# Patient Record
Sex: MALE | Race: WHITE | NOT HISPANIC OR LATINO | Employment: OTHER | URBAN - METROPOLITAN AREA
[De-identification: names, ages, dates, MRNs, and addresses within clinical notes are randomized per-mention and may not be internally consistent; named-entity substitution may affect disease eponyms.]

---

## 2017-10-17 ENCOUNTER — OFFICE VISIT (OUTPATIENT)
Dept: FAMILY MEDICINE | Facility: CLINIC | Age: 62
End: 2017-10-17

## 2017-10-17 VITALS
DIASTOLIC BLOOD PRESSURE: 108 MMHG | WEIGHT: 211.2 LBS | HEIGHT: 68 IN | SYSTOLIC BLOOD PRESSURE: 144 MMHG | OXYGEN SATURATION: 93 % | BODY MASS INDEX: 32.01 KG/M2 | HEART RATE: 56 BPM

## 2017-10-17 DIAGNOSIS — Z12.5 SCREENING FOR PROSTATE CANCER: ICD-10-CM

## 2017-10-17 DIAGNOSIS — I10 BENIGN ESSENTIAL HYPERTENSION: ICD-10-CM

## 2017-10-17 DIAGNOSIS — Z23 NEED FOR TD VACCINE: ICD-10-CM

## 2017-10-17 DIAGNOSIS — Z11.59 NEED FOR HEPATITIS C SCREENING TEST: Primary | ICD-10-CM

## 2017-10-17 LAB
% GRANULOCYTES: 71 % (ref 42.2–75.2)
HCT VFR BLD AUTO: 46.9 % (ref 39–51)
HEMOGLOBIN: 15.5 G/DL (ref 13.4–17.5)
LYMPHOCYTES NFR BLD AUTO: 21.7 % (ref 20.5–51.1)
MCH RBC QN AUTO: 29.9 PG (ref 27–31)
MCHC RBC AUTO-ENTMCNC: 33.1 G/DL (ref 33–37)
MCV RBC AUTO: 90.4 FL (ref 80–100)
MONOCYTES NFR BLD AUTO: 7.3 % (ref 1.7–9.3)
PLATELET # BLD AUTO: 283 K/UL (ref 140–450)
RBC # BLD AUTO: 5.19 X10/CMM (ref 4.2–5.9)
WBC # BLD AUTO: 7.2 X10/CMM (ref 3.8–11)

## 2017-10-17 PROCEDURE — 99214 OFFICE O/P EST MOD 30 MIN: CPT | Mod: 25 | Performed by: FAMILY MEDICINE

## 2017-10-17 PROCEDURE — 90714 TD VACC NO PRESV 7 YRS+ IM: CPT | Performed by: FAMILY MEDICINE

## 2017-10-17 PROCEDURE — 80050 GENERAL HEALTH PANEL: CPT | Mod: 90 | Performed by: FAMILY MEDICINE

## 2017-10-17 PROCEDURE — 90471 IMMUNIZATION ADMIN: CPT | Performed by: FAMILY MEDICINE

## 2017-10-17 PROCEDURE — 36415 COLL VENOUS BLD VENIPUNCTURE: CPT | Performed by: FAMILY MEDICINE

## 2017-10-17 PROCEDURE — 84153 ASSAY OF PSA TOTAL: CPT | Mod: 90 | Performed by: FAMILY MEDICINE

## 2017-10-17 RX ORDER — LISINOPRIL 10 MG/1
10 TABLET ORAL DAILY
Qty: 30 TABLET | Refills: 1 | Status: SHIPPED | OUTPATIENT
Start: 2017-10-17 | End: 2017-11-02

## 2017-10-17 NOTE — PATIENT INSTRUCTIONS
Hypertension   What is hypertension?   Hypertension is blood pressure that keeps being higher than normal. Blood pressure is the force of blood against artery walls as the heart pumps blood through the body. Blood pressure can be unhealthy if it is above 120/80. The higher your blood pressure, the greater the health risk.   High blood pressure can be controlled if you take these steps:   Maintain a healthy weight.   Are physically active.   Follow a healthy eating plan, which includes foods that do not have a lot of salt and sodium.   Do not drink a lot of alcohol.   Our goal is to keep the systolic (top) number 138 or lower and the diastolic (bottom) number 83 or lower                                   Dietary Approaches to Stop Hypertension (The DASH Diet)   What is hypertension?   Hypertension is blood pressure that keeps being higher than normal. Blood pressure is the force of blood against artery walls as the heart pumps blood through the body. Blood pressure can be unhealthy if it is above 120/80. The higher your blood pressure, the greater the health risk.   High blood pressure can be controlled if you take these steps:   Maintain a healthy weight.   Are physically active.   Follow a healthy eating plan, which includes foods that do not have a lot of salt and sodium.   Do not drink a lot of alcohol.   Diet affects high blood pressure. Following the DASH diet and reducing the amount of sodium in your diet will help lower your blood pressure. It will also help prevent high blood pressure.   What is the DASH diet?   Dietary Approaches to Stop Hypertension (DASH) is a diet that is low in saturated fat, cholesterol, and total fat. It emphasizes fruits, vegetables, and low-fat dairy foods. The DASH diet also includes whole-grain products, fish, poultry, and nuts. It encourages fewer servings of red meat, sweets, and sugar-containing beverages. It is rich in magnesium, potassium, and calcium, as well as protein  and fiber.   How do I get started on the DASH diet?   The DASH diet requires no special foods and has no hard-to-follow recipes. Start by seeing how DASH compares with your current eating habits.   The DASH eating plan illustrated below is based on a diet of 2,000 calories a day. Your healthcare provider or a dietitian can help you determine how many calories a day you need. Most adults need somewhere between 1600 and 2800 calories a day. Serving sizes for different foods vary from 1/2 cup to 1 and 1/4 cups. Check product nutrition labels for serving sizes and the number of calories per serving.                      Number of        Examples of  Food Group      servings         serving size  ----------------------------------------------------------------    Grains and      7 to 8 per day   1 slice of bread  grain products                   1 cup ready-to-eat cold cereal                                   1/2 cup cooked rice, pasta,                                   or cereal    Vegetables      4 to 5 per day   1 cup raw leafy vegetable                                   1/2 cup cooked vegetable                                   6 ounces (oz) vegetable juice      Fruits          4 to 5 per day   1 medium fruit                                   1/4 cup dried fruit                                   1/2 cup fresh, frozen, or                                   canned fruit                                   6 oz fruit juice      Low-fat or      2 to 3 per day   8 oz milk  fat-free                         1 cup yogurt  dairy foods                      1 and 1/2 oz cheese    Lean meats,  poultry,        2 or fewer per   3 oz cooked lean meat,  or fish         day              skinless poultry, or fish    Nuts, seeds,    4 to 5 per week  1/3 cup or 1 and 1/2 oz nuts  and dry beans                    1 tablespoon or 1/2 oz seeds                                   1/2 cup cooked dry beans    Fats and oils   2 to 3 per day   1  teaspoon soft margarine                                   1 tablespoon low-fat mayonnaise                                   2 tablespoons light salad                                   dressing                                   1 teaspoon vegetable oil    Sweets          5 per week       1 tablespoon sugar                                   1 tablespoon jelly or jam                                   1/2 oz jelly beans                                   8 oz lemonade  ----------------------------------------------------------------  Make changes gradually. Here are some suggestions that might help:   If you now eat 1 or 2 servings of vegetables a day, add a serving at lunch and another at dinner.   If you have not been eating fruit regularly, or have only juice at breakfast, add a serving to your meals or have it as a snack.   Drink milk or water with lunch or dinner instead of soda, sugar-sweetened tea, or alcohol. Choose low-fat (1%) or fat-free (nonfat) dairy products so that you are eating fewer calories and less saturated fat, total fat, and cholesterol.   Read food labels on margarines and salad dressings to choose products lowest in fat.   If you now eat large portions of meat, slowly cut back--by a half or a third at each meal. Limit meat to 6 ounces a day (two 3-ounce servings). Three to 4 ounces is about the size of a deck of cards.   Have 2 or more meatless meals each week. Increase servings of vegetables, rice, pasta, and beans in all meals. Try casseroles, pasta, and stir-pérez dishes, which have less meat and more vegetables, grains, and beans.   Use fruits canned in their own juice. Fresh fruits require little or no preparation. Dried fruits are a good choice to carry with you or to have ready in the car.   Try these snacks ideas: unsalted pretzels or nuts mixed with raisins, irina crackers, low-fat and fat-free yogurt or frozen yogurt, popcorn with no salt or butter added, and raw vegetables.   Choose  whole-grain foods to get more nutrients, including minerals and fiber. For example, choose whole-wheat bread, whole-grain cereals, or brown rice.   Use fresh, frozen, or no-salt-added canned vegetables.   Remember to also reduce the salt and sodium in your diet. Try to have no more than 2000 milligrams (mg) of sodium per day, with a goal of further reducing the sodium to 1500 mg per day. Three important ways to reduce sodium are:   Eat food products with reduced-sodium or no salt added.   Use less salt when you prepare foods and do not add salt to your food at the table.   Read food labels. Aim for foods that contain less than 5% of the daily value of sodium.   The DASH eating plan is not designed for weight loss. But it contains many lower-calorie foods, such as fruits and vegetables. You can make it lower in calories by replacing high-calorie foods with more fruits and vegetables. Some ideas to increase fruits and vegetables and decrease calories include:   Eat a medium apple instead of 4 shortbread cookies. You'll save 80 calories.   Eat 1/4 cup of dried apricots instead of a 2-ounce bag of pork rinds. You'll save 230 calories.   Have a hamburger that's 3 ounces instead of 6 ounces. Add a 1/2 cup serving of carrots and a 1/2 cup serving of spinach. You'll save more than 200 calories.   Instead of 5 ounces of chicken, have a stir pérez with 2 ounces of chicken and 1 and 1/2 cups of raw vegetables. Use just a small amount of vegetable oil. You'll save 50 calories.   Have a 1/2 cup serving of low-fat frozen yogurt instead of a 1-and-1/2-ounce chocolate bar. You'll save about 110 calories.   Use low-fat or fat-free condiments, such as fat-free salad dressings.   Eat smaller portions. Cut back gradually.   Use food labels to compare fat and calorie content in packaged foods. Items marked low fat or fat free may be lower in fat but not lower in calories than their regular versions.   Limit foods with lots of added sugar,  such as pies, flavored yogurts, candy bars, ice cream, sherbet, regular soft drinks, and fruit drinks.   Drink water or club soda instead of cola or other soda drinks.     For more information, see the National Heart, Lung, and Blood [place] Web site at: http://www.nhlbi.nih.gov/health/public/heart/hbp/dash/.

## 2017-10-17 NOTE — MR AVS SNAPSHOT
After Visit Summary   10/17/2017    Darrel Bernal    MRN: [id number]           Patient Information     Date Of Birth          1955        Visit Information        Provider Department      10/17/2017 4:30 PM David Padron MD [facility]        Today's Diagnoses     Need for hepatitis C screening test    -  1    Need for TD vaccine        Benign essential hypertension        Screening for prostate cancer          Care Instructions      Hypertension   What is hypertension?   Hypertension is blood pressure that keeps being higher than normal. Blood pressure is the force of blood against artery walls as the heart pumps blood through the body. Blood pressure can be unhealthy if it is above 120/80. The higher your blood pressure, the greater the health risk.   High blood pressure can be controlled if you take these steps:   Maintain a healthy weight.   Are physically active.   Follow a healthy eating plan, which includes foods that do not have a lot of salt and sodium.   Do not drink a lot of alcohol.   Our goal is to keep the systolic (top) number 138 or lower and the diastolic (bottom) number 83 or lower                                   Dietary Approaches to Stop Hypertension (The DASH Diet)   What is hypertension?   Hypertension is blood pressure that keeps being higher than normal. Blood pressure is the force of blood against artery walls as the heart pumps blood through the body. Blood pressure can be unhealthy if it is above 120/80. The higher your blood pressure, the greater the health risk.   High blood pressure can be controlled if you take these steps:   Maintain a healthy weight.   Are physically active.   Follow a healthy eating plan, which includes foods that do not have a lot of salt and sodium.   Do not drink a lot of alcohol.   Diet affects high blood pressure. Following the DASH diet and reducing the amount of sodium in your diet will help lower your blood pressure. It  will also help prevent high blood pressure.   What is the DASH diet?   Dietary Approaches to Stop Hypertension (DASH) is a diet that is low in saturated fat, cholesterol, and total fat. It emphasizes fruits, vegetables, and low-fat dairy foods. The DASH diet also includes whole-grain products, fish, poultry, and nuts. It encourages fewer servings of red meat, sweets, and sugar-containing beverages. It is rich in magnesium, potassium, and calcium, as well as protein and fiber.   How do I get started on the DASH diet?   The DASH diet requires no special foods and has no hard-to-follow recipes. Start by seeing how DASH compares with your current eating habits.   The DASH eating plan illustrated below is based on a diet of 2,000 calories a day. Your healthcare provider or a dietitian can help you determine how many calories a day you need. Most adults need somewhere between 1600 and 2800 calories a day. Serving sizes for different foods vary from 1/2 cup to 1 and 1/4 cups. Check product nutrition labels for serving sizes and the number of calories per serving.                      Number of        Examples of  Food Group      servings         serving size  ----------------------------------------------------------------    Grains and      7 to 8 per day   1 slice of bread  grain products                   1 cup ready-to-eat cold cereal                                   1/2 cup cooked rice, pasta,                                   or cereal    Vegetables      4 to 5 per day   1 cup raw leafy vegetable                                   1/2 cup cooked vegetable                                   6 ounces (oz) vegetable juice      Fruits          4 to 5 per day   1 medium fruit                                   1/4 cup dried fruit                                   1/2 cup fresh, frozen, or                                   canned fruit                                   6 oz fruit juice      Low-fat or      2 to 3 per day   8  oz milk  fat-free                         1 cup yogurt  dairy foods                      1 and 1/2 oz cheese    Lean meats,  poultry,        2 or fewer per   3 oz cooked lean meat,  or fish         day              skinless poultry, or fish    Nuts, seeds,    4 to 5 per week  1/3 cup or 1 and 1/2 oz nuts  and dry beans                    1 tablespoon or 1/2 oz seeds                                   1/2 cup cooked dry beans    Fats and oils   2 to 3 per day   1 teaspoon soft margarine                                   1 tablespoon low-fat mayonnaise                                   2 tablespoons light salad                                   dressing                                   1 teaspoon vegetable oil    Sweets          5 per week       1 tablespoon sugar                                   1 tablespoon jelly or jam                                   1/2 oz jelly beans                                   8 oz lemonade  ----------------------------------------------------------------  Make changes gradually. Here are some suggestions that might help:   If you now eat 1 or 2 servings of vegetables a day, add a serving at lunch and another at dinner.   If you have not been eating fruit regularly, or have only juice at breakfast, add a serving to your meals or have it as a snack.   Drink milk or water with lunch or dinner instead of soda, sugar-sweetened tea, or alcohol. Choose low-fat (1%) or fat-free (nonfat) dairy products so that you are eating fewer calories and less saturated fat, total fat, and cholesterol.   Read food labels on margarines and salad dressings to choose products lowest in fat.   If you now eat large portions of meat, slowly cut back--by a half or a third at each meal. Limit meat to 6 ounces a day (two 3-ounce servings). Three to 4 ounces is about the size of a deck of cards.   Have 2 or more meatless meals each week. Increase servings of vegetables, rice, pasta, and beans in all meals. Try  casseroles, pasta, and stir-pérez dishes, which have less meat and more vegetables, grains, and beans.   Use fruits canned in their own juice. Fresh fruits require little or no preparation. Dried fruits are a good choice to carry with you or to have ready in the car.   Try these snacks ideas: unsalted pretzels or nuts mixed with raisins, irina crackers, low-fat and fat-free yogurt or frozen yogurt, popcorn with no salt or butter added, and raw vegetables.   Choose whole-grain foods to get more nutrients, including minerals and fiber. For example, choose whole-wheat bread, whole-grain cereals, or brown rice.   Use fresh, frozen, or no-salt-added canned vegetables.   Remember to also reduce the salt and sodium in your diet. Try to have no more than 2000 milligrams (mg) of sodium per day, with a goal of further reducing the sodium to 1500 mg per day. Three important ways to reduce sodium are:   Eat food products with reduced-sodium or no salt added.   Use less salt when you prepare foods and do not add salt to your food at the table.   Read food labels. Aim for foods that contain less than 5% of the daily value of sodium.   The DASH eating plan is not designed for weight loss. But it contains many lower-calorie foods, such as fruits and vegetables. You can make it lower in calories by replacing high-calorie foods with more fruits and vegetables. Some ideas to increase fruits and vegetables and decrease calories include:   Eat a medium apple instead of 4 shortbread cookies. You'll save 80 calories.   Eat 1/4 cup of dried apricots instead of a 2-ounce bag of pork rinds. You'll save 230 calories.   Have a hamburger that's 3 ounces instead of 6 ounces. Add a 1/2 cup serving of carrots and a 1/2 cup serving of spinach. You'll save more than 200 calories.   Instead of 5 ounces of chicken, have a stir pérez with 2 ounces of chicken and 1 and 1/2 cups of raw vegetables. Use just a small amount of vegetable oil. You'll save 50  "calories.   Have a 1/2 cup serving of low-fat frozen yogurt instead of a 1-and-1/2-ounce chocolate bar. You'll save about 110 calories.   Use low-fat or fat-free condiments, such as fat-free salad dressings.   Eat smaller portions. Cut back gradually.   Use food labels to compare fat and calorie content in packaged foods. Items marked low fat or fat free may be lower in fat but not lower in calories than their regular versions.   Limit foods with lots of added sugar, such as pies, flavored yogurts, candy bars, ice cream, sherbet, regular soft drinks, and fruit drinks.   Drink water or club soda instead of cola or other soda drinks.     For more information, see the National Heart, Lung, and Blood [place] Web site at: http://www.nhlbi.nih.gov/health/public/heart/hbp/dash/.             Follow-ups after your visit        Who to contact     If you have questions or need follow up information about today's clinic visit or your schedule please contact [facility] directly at [phone].  Normal or non-critical lab and imaging results will be communicated to you by Berylliumhart, letter or phone within 4 business days after the clinic has received the results. If you do not hear from us within 7 days, please contact the clinic through Kidamomt or phone. If you have a critical or abnormal lab result, we will notify you by phone as soon as possible.  Submit refill requests through FOBO or call your pharmacy and they will forward the refill request to us. Please allow 3 business days for your refill to be completed.          Additional Information About Your Visit        [email or web address] Information     FOBO lets you send messages to your doctor, view your test results, renew your prescriptions, schedule appointments and more. To sign up, go to [email or web address] . Click on \"Log in\" on the left side of the screen, which will take you to the Welcome page. Then click on \"Sign up Now\" on the right side of the page. " "    You will be asked to enter the access code listed below, as well as some personal information. Please follow the directions to create your username and password.     Your access code is: [email or web address]  Expires: 1/15/2018  5:25 PM     Your access code will  in 90 days. If you need help or a new code, please call your [place] clinic or [phone].        Care EveryWhere ID     This is your Care EveryWhere ID. This could be used by other organizations to access your [place] medical records  [id number]        Your Vitals Were     Pulse Height Pulse Oximetry BMI (Body Mass Index)          56 1.715 m (5' 7.5\") 93% 32.59 kg/m2         Blood Pressure from Last 3 Encounters:   10/17/17 (!) 144/108   10/15/15 120/83   09/11/15 (!) 160/100    Weight from Last 3 Encounters:   10/17/17 95.8 kg (211 lb 3.2 oz)   10/15/15 92.1 kg (203 lb)   09/11/15 96.2 kg (212 lb)              We Performed the Following     CBC with Diff/Plt ([facility])     Comp. Metabolic Panel (14) ([facility])     PSA Serum ([facility])     TD PRESERV FREE >=7 YRS ADS IM     TSH ([facility])          Today's Medication Changes          These changes are accurate as of: 10/17/17  5:25 PM.  If you have any questions, ask your nurse or doctor.               Start taking these medicines.        Dose/Directions    lisinopril 10 MG tablet   Commonly known as:  PRINIVIL/ZESTRIL   Used for:  Benign essential hypertension   Started by:  David Padron MD        Dose:  10 mg   Take 1 tablet (10 mg) by mouth daily   Quantity:  30 tablet   Refills:  1            Where to get your medicines      These medications were sent to [facility] [address] [facility] [place] [address]  [address] [facility] [address]     Phone:  [phone]     lisinopril 10 MG tablet                Primary Care Provider Office Phone # Fax #    David Padron [phone] [address] [place] [address]        Equal Access to " Services     [facility]: Hadii aad ku hadyoletteju Sameeratereza, waaxda luqadaha, qaybta kaalmada isabel, yana julio . So [facility] [phone].    ATENCIÓN: Si habla español, tiene a jerez disposición servicios gratuitos de asistencia lingüística. Llame al [phone].    We comply with applicable federal civil rights laws and [place] laws. We do not discriminate on the basis of race, color, national origin, age, disability, sex, sexual orientation, or gender identity.            Thank you!     Thank you for choosing [facility]  for your care. Our goal is always to provide you with excellent care. Hearing back from our patients is one way we can continue to improve our services. Please take a few minutes to complete the written survey that you may receive in the mail after your visit with us. Thank you!             Your Updated Medication List - Protect others around you: Learn how to safely use, store and throw away your medicines at www.disposemymeds.org.          This list is accurate as of: 10/17/17  5:25 PM.  Always use your most recent med list.                   Brand Name Dispense Instructions for use Diagnosis    AMBIEN 10 MG tablet   Generic drug:  zolpidem     10 tablet    Take 1 tablet (10 mg) by mouth nightly as needed for sleep    Insomnia       aspirin 81 MG chewable tablet      Take 81 mg by mouth daily        lisinopril 10 MG tablet    PRINIVIL/ZESTRIL    30 tablet    Take 1 tablet (10 mg) by mouth daily    Benign essential hypertension

## 2017-10-17 NOTE — LETTER
[facility] [address] [place]  [address]  Phone: [phone]    October 20, 2017      Darrel Bernal  [address] [facility] [address]              Dear Darrel,    I am writing to report that your included test results are within expected ranges. I do not suggest that we make any changes at this time.         Sincerely,     David Padron M.D.    Results for orders placed or performed in visit on 10/17/17   Comp. Metabolic Panel (14) ([facility])   Result Value Ref Range    Glucose 83 65 - 99 mg/dL    Urea Nitrogen 22 8 - 27 mg/dL    Creatinine 0.97 0.76 - 1.27 mg/dL    eGFR If NonAfricn Am 83 >59 mL/min/1.73    eGFR If Africn Am 96 >59 mL/min/1.73    BUN/Creatinine Ratio 23 10 - 24    Sodium 142 134 - 144 mmol/L    Potassium 4.6 3.5 - 5.2 mmol/L    Chloride 102 96 - 106 mmol/L    Total CO2 24 18 - 28 mmol/L    Calcium 9.5 8.6 - 10.2 mg/dL    Protein Total 7.5 6.0 - 8.5 g/dL    Albumin 4.6 3.6 - 4.8 g/dL    Globulin, Total 2.9 1.5 - 4.5 g/dL    A/G Ratio 1.6 1.2 - 2.2    Bilirubin Total 0.4 0.0 - 1.2 mg/dL    Alkaline Phosphatase 89 39 - 117 IU/L    AST 36 0 - 40 IU/L    ALT 46 (H) 0 - 44 IU/L    Narrative    Performed at:  01 - [facility] [address], [place], [place]  [id number]  : El Arthur MD, Phone:  [phone]   CBC with Diff/Plt ([facility])   Result Value Ref Range    WBC x10/cmm 7.2 3.8 - 11.0 x10/cmm    % Lymphocytes 21.7 20.5 - 51.1 %    % Monocytes 7.3 1.7 - 9.3 %    % Granulocytes 71.0 42.2 - 75.2 %    RBC x10/cmm 5.19 4.2 - 5.9 x10/cmm    Hemoglobin 15.5 13.4 - 17.5 g/dl    Hematocrit 46.9 39 - 51 %    MCV 90.4 80 - 100 fL    MCH 29.9 27.0 - 31.0 pg    MCHC 33.1 33.0 - 37.0 g/dL    Platelet Count 283 140 - 450 K/uL   PSA Serum ([facility])   Result Value Ref Range    PSA NG/ML 1.5 0.0 - 4.0 ng/mL    Narrative    Performed at:  01 - [facility] [address], [place], [place]  [id number]  : El Arthur MD, Phone:  [phone]   TSH  ([facility])   Result Value Ref Range    TSH 2.670 0.450 - 4.500 uIU/mL    Narrative    Performed at:  01 - [facility] [address], [place], [place]  [id number]  : El Arthur MD, Phone:  [phone]

## 2017-10-17 NOTE — PROGRESS NOTES
Problem(s) Oriented visit        SUBJECTIVE:                                                    Darrel Bernal is a 62 year old male who presents to clinic today for the following health issues :    1. Need for hepatitis C screening test  due    2. Need for TD vaccine  due  - TD PRESERV FREE >=7 YRS ADS IM    3. Benign essential hypertension  he has Hypertension which is discovered today to not currently well controlled. he has not been on any medications and is interested to see if we can obtain better control of the blood pressure.    Reviewed last 6 BP readings in chart:  BP Readings from Last 6 Encounters:   10/17/17 (!) 144/108   10/15/15 120/83   09/11/15 (!) 160/100   12/03/13 120/70   11/18/13 140/80     NO active cardiac complaints or symptoms with exercise.       - Comp. Metabolic Panel (14) ([facility])  - CBC with Diff/Plt ([facility])  - TSH ([facility])  - lisinopril (PRINIVIL/ZESTRIL) 10 MG tablet; Take 1 tablet (10 mg) by mouth daily  Dispense: 30 tablet; Refill: 1    4. Screening for prostate cancer    - PSA Serum ([facility])         Problem list, Medication list, Allergies, and Medical/Social/Surgical histories reviewed in [facility] and updated as appropriate.   Additional history: as documented    ROS:  General and Resp. 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        OBJECTIVE:                                                    BP (!) 144/108  Pulse 56  Ht 1.715 m (5' 7.5\")  Wt 95.8 kg (211 lb 3.2 oz)  SpO2 93%  BMI 32.59 kg/m2  Body mass index is 32.59 kg/(m^2).   APPEARANCE: = Relaxed and in no distress  Conj/Eyelids = noninjected and lids and lashes are without inflammation  PERRLA/Irises = Pupils Round Reactive to Light and Irisis without inflammation  Neck = No anterior or posterior adenopathy appreciated.  Thyroid = Not enlarged and no masses felt  Resp effort = Calm regular breathing  Breath Sounds = Good air movement with no rales or rhonchi in any lung fields  Heart Rate, Rythym, & sounds (no Murm)  = Regular rate and rythym with no S3, S4, or murmer appreciated.  Carotid Art's = Pulses full and equal and no bruits appreciated  Abdomen = Soft, nontender, no masses, & bowel sounds in all quadrants  Liver/Spleen = Normal span and no splenomegaly noted  Digits and Nails = FROM in all finger joints, no nail dystrophy  Ext (edema) = No pretibial edema noted or elsewhere  Musculsktl =  Strength and ROM of major joints are within normal limits  SKIN = absent significant rashes or lesions   Recent/Remote Memory = Alert and Oriented x 3  Mood/Affect = Cooperative and interested     ASSESSMENT/PLAN:                                                        Darrel was seen today for hypertension and recheck medication.    Diagnoses and all orders for this visit:    Need for hepatitis C screening test    Need for TD vaccine  -     TD PRESERV FREE >=7 YRS ADS IM    Benign essential hypertension  -     Comp. Metabolic Panel (14) ([facility])  -     CBC with Diff/Plt ([facility])  -     TSH ([facility])  -     lisinopril (PRINIVIL/ZESTRIL) 10 MG tablet; Take 1 tablet (10 mg) by mouth daily    Screening for prostate cancer  -     PSA Serum ([facility])    Other orders  -     Cancel: HCV Antibody " ([facility])      Work on weight loss  Regular exercise    The following health maintenance items are reviewed in Epic and correct as of today:  Health Maintenance   Topic Date Due     TETANUS IMMUNIZATION (SYSTEM ASSIGNED)  03/16/1972     HEPATITIS C SCREENING  03/16/1972     ADVANCE DIRECTIVE PLANNING Q5 YRS  12/03/2018     LIPID SCREEN Q5 YR MALE (SYSTEM ASSIGNED)  09/11/2020     COLONOSCOPY Q10 YR  10/19/2025     INFLUENZA VACCINE (SYSTEM ASSIGNED)  Completed       David Padron MD  [facility]  Family Practice  [facility]  [phone]    For any issues my office # is [phone]

## 2017-10-19 LAB
ALBUMIN SERPL-MCNC: 4.6 G/DL (ref 3.6–4.8)
ALBUMIN/GLOB SERPL: 1.6 {RATIO} (ref 1.2–2.2)
ALP SERPL-CCNC: 89 IU/L (ref 39–117)
ALT SERPL-CCNC: 46 IU/L (ref 0–44)
AST SERPL-CCNC: 36 IU/L (ref 0–40)
BILIRUB SERPL-MCNC: 0.4 MG/DL (ref 0–1.2)
BUN SERPL-MCNC: 22 MG/DL (ref 8–27)
BUN/CREATININE RATIO: 23 (ref 10–24)
CALCIUM SERPL-MCNC: 9.5 MG/DL (ref 8.6–10.2)
CHLORIDE SERPLBLD-SCNC: 102 MMOL/L (ref 96–106)
CREAT SERPL-MCNC: 0.97 MG/DL (ref 0.76–1.27)
EGFR IF AFRICN AM: 96 ML/MIN/1.73
EGFR IF NONAFRICN AM: 83 ML/MIN/1.73
GLOBULIN, TOTAL: 2.9 G/DL (ref 1.5–4.5)
GLUCOSE SERPL-MCNC: 83 MG/DL (ref 65–99)
POTASSIUM SERPL-SCNC: 4.6 MMOL/L (ref 3.5–5.2)
PROT SERPL-MCNC: 7.5 G/DL (ref 6–8.5)
PSA NG/ML: 1.5 NG/ML (ref 0–4)
SODIUM SERPL-SCNC: 142 MMOL/L (ref 134–144)
TOTAL CO2: 24 MMOL/L (ref 18–28)
TSH BLD-ACNC: 2.67 UIU/ML (ref 0.45–4.5)

## 2017-10-19 NOTE — PROGRESS NOTES
Dear Darrel,   I am writing to report that your included test results are within expected ranges. I do not suggest that we make any changes at this time.    David Padron M.D.

## 2017-11-02 ENCOUNTER — OFFICE VISIT (OUTPATIENT)
Dept: FAMILY MEDICINE | Facility: CLINIC | Age: 62
End: 2017-11-02

## 2017-11-02 VITALS
HEART RATE: 66 BPM | SYSTOLIC BLOOD PRESSURE: 124 MMHG | DIASTOLIC BLOOD PRESSURE: 88 MMHG | OXYGEN SATURATION: 97 % | RESPIRATION RATE: 20 BRPM | BODY MASS INDEX: 32.16 KG/M2 | WEIGHT: 208.4 LBS

## 2017-11-02 DIAGNOSIS — I10 BENIGN ESSENTIAL HYPERTENSION: Primary | ICD-10-CM

## 2017-11-02 DIAGNOSIS — Z13.220 LIPID SCREENING: ICD-10-CM

## 2017-11-02 PROCEDURE — 99213 OFFICE O/P EST LOW 20 MIN: CPT | Performed by: FAMILY MEDICINE

## 2017-11-02 PROCEDURE — 36415 COLL VENOUS BLD VENIPUNCTURE: CPT | Performed by: FAMILY MEDICINE

## 2017-11-02 PROCEDURE — 80061 LIPID PANEL: CPT | Mod: 90 | Performed by: FAMILY MEDICINE

## 2017-11-02 PROCEDURE — 80048 BASIC METABOLIC PNL TOTAL CA: CPT | Mod: 90 | Performed by: FAMILY MEDICINE

## 2017-11-02 RX ORDER — LISINOPRIL 10 MG/1
10 TABLET ORAL DAILY
Qty: 30 TABLET | Refills: 11 | Status: SHIPPED | OUTPATIENT
Start: 2017-11-02 | End: 2019-01-16

## 2017-11-02 NOTE — LETTER
[facility] [address] [place]  [address]  Phone: [phone]    November 8, 2017      Darrel Bernal  [address] [facility] [facility] [facility] [address]              Dear Darrel,    I am writing to report that your included test results are within expected ranges. I do not suggest that we make any changes at this time.       Sincerely,     David Padron M.D.    Results for orders placed or performed in visit on 11/02/17   Lipid Panel ([facility])   Result Value Ref Range    Cholesterol 192 100 - 199 mg/dL    Triglycerides 61 0 - 149 mg/dL    HDL Cholesterol 52 >39 mg/dL    VLDL Cholesterol Andrew 12 5 - 40 mg/dL    LDL Cholesterol Calculated 128 (H) 0 - 99 mg/dL    LDL/HDL Ratio 2.5 0.0 - 3.6 ratio units    Narrative    Performed at:  01 - [facility] [address], [place], [place]  [id number]  : El Arthur MD, Phone:  [phone]   Basic Metabolic Panel (8) ([facility])   Result Value Ref Range    Glucose 93 65 - 99 mg/dL    Urea Nitrogen 21 8 - 27 mg/dL    Creatinine 0.87 0.76 - 1.27 mg/dL    eGFR If NonAfricn Am 92 >59 mL/min/1.73    eGFR If Africn Am 106 >59 mL/min/1.73    BUN/Creatinine Ratio 24 10 - 24    Sodium 141 134 - 144 mmol/L    Potassium 4.8 3.5 - 5.2 mmol/L    Chloride 102 96 - 106 mmol/L    Total CO2 25 18 - 28 mmol/L    Calcium 9.2 8.6 - 10.2 mg/dL    Narrative    Performed at:  01 - [facility] [address], [place], [place]  [id number]  : El Arthur MD, Phone:  [phone]

## 2017-11-02 NOTE — PROGRESS NOTES
Problem(s) Oriented visit        SUBJECTIVE:                                                    Darrel Bernal is a 62 year old male who presents to clinic today for the following health issues :        1. Benign essential hypertension  Blood presure remains well controlled when checked out of clinic.    Reviewed last 6 BP readings in chart:  BP Readings from Last 6 Encounters:   11/02/17 124/88   10/17/17 (!) 144/108   10/15/15 120/83   09/11/15 (!) 160/100   12/03/13 120/70   11/18/13 140/80       he has not experienced any significant side effects from medications for hypertension.    NO active cardiac complaints or symptoms with exercise.    - Basic Metabolic Panel (8) ([facility])    2. Lipid screening  Due   - Lipid Panel ([facility])         Problem list, Medication list, Allergies, and Medical/Social/Surgical histories reviewed in EPIC and updated as appropriate.   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        OBJECTIVE:                                                    /88  Pulse 66  Resp 20  Wt 94.5 kg (208 lb 6.4 oz)  SpO2 97%  BMI 32.16 kg/m2  Body mass index is 32.16 kg/(m^2).   APPEARANCE:  = Relaxed and in no distress     ASSESSMENT/PLAN:                                                        Darrel was seen today for hypertension and recheck medication.    Diagnoses and all orders for this visit:    Benign essential hypertension  -     Basic Metabolic Panel (8) ([facility])    Lipid screening  -     Lipid Panel ([facility])        Work on weight loss  Regular exercise    The following health maintenance items are reviewed in Epic and correct as of today:  Health Maintenance   Topic Date Due     HEPATITIS C SCREENING  03/16/1972     ADVANCE DIRECTIVE PLANNING Q5 YRS  12/03/2018     LIPID SCREEN Q5 YR MALE (SYSTEM ASSIGNED)  09/11/2020     COLONOSCOPY Q10 YR  10/19/2025     TETANUS IMMUNIZATION (SYSTEM ASSIGNED)  10/17/2027     INFLUENZA VACCINE (SYSTEM ASSIGNED)  Completed       David Padron MD  [facility]  Family Practice  [facility]  [phone]    For any issues my office # is [phone]

## 2017-11-02 NOTE — MR AVS SNAPSHOT
"              After Visit Summary   2017    Darrel Bernal    MRN: [id number]           Patient Information     Date Of Birth          1955        Visit Information        Provider Department      2017 12:45 PM David Padron MD [facility]        Today's Diagnoses     Benign essential hypertension    -  1    Lipid screening           Follow-ups after your visit        Who to contact     If you have questions or need follow up information about today's clinic visit or your schedule please contact [facility] directly at [phone].  Normal or non-critical lab and imaging results will be communicated to you by MyChart, letter or phone within 4 business days after the clinic has received the results. If you do not hear from us within 7 days, please contact the clinic through [employer] or phone. If you have a critical or abnormal lab result, we will notify you by phone as soon as possible.  Submit refill requests through [employer] or call your pharmacy and they will forward the refill request to us. Please allow 3 business days for your refill to be completed.          Additional Information About Your Visit        MyChart Information     [employer] lets you send messages to your doctor, view your test results, renew your prescriptions, schedule appointments and more. To sign up, go to [email or web address] . Click on \"Log in\" on the left side of the screen, which will take you to the Welcome page. Then click on \"Sign up Now\" on the right side of the page.     You will be asked to enter the access code listed below, as well as some personal information. Please follow the directions to create your username and password.     Your access code is: [email or web address]  Expires: 1/15/2018  5:25 PM     Your access code will  in 90 days. If you need help or a new code, please call your [place] clinic or [phone].        Care EveryWhere ID     This is your Care EveryWhere ID. This " could be used by other organizations to access your [facility] medical records  [id number]        Your Vitals Were     Pulse Respirations Pulse Oximetry BMI (Body Mass Index)          66 20 97% 32.16 kg/m2         Blood Pressure from Last 3 Encounters:   11/02/17 124/88   10/17/17 (!) 144/108   10/15/15 120/83    Weight from Last 3 Encounters:   11/02/17 94.5 kg (208 lb 6.4 oz)   10/17/17 95.8 kg (211 lb 3.2 oz)   10/15/15 92.1 kg (203 lb)              We Performed the Following     Basic Metabolic Panel (8) ([facility])     Lipid Panel ([facility])          Where to get your medicines      These medications were sent to [facility] [address] [facility] - [place], [place] - [address]  [address] [address]     Phone:  [phone]     lisinopril 10 MG tablet          Primary Care Provider Office Phone # Fax #    David Padron [phone] [address]  [facility] [address]        Equal Access to Services     [facility]: Hadii aad ku hadasho Soomaali, waaxda luqadaha, qaybta kaalmada adeegyada, waxay idiin hayaan sherif lorenzoaraisa julio ah. So [facility] [phone].    ATENCIÓN: Si habla español, tiene a jerez disposición servicios gratuitos de asistencia lingüística. Llame al [phone].    We comply with applicable federal civil rights laws and [place] laws. We do not discriminate on the basis of race, color, national origin, age, disability, sex, sexual orientation, or gender identity.            Thank you!     Thank you for choosing [facility]  for your care. Our goal is always to provide you with excellent care. Hearing back from our patients is one way we can continue to improve our services. Please take a few minutes to complete the written survey that you may receive in the mail after your visit with us. Thank you!             Your Updated Medication List - Protect others around you: Learn how to safely use, store and throw away your medicines  at www.disposemymeds.org.          This list is accurate as of: 11/2/17  1:28 PM.  Always use your most recent med list.                   Brand Name Dispense Instructions for use Diagnosis    AMBIEN 10 MG tablet   Generic drug:  zolpidem     10 tablet    Take 10 mg by mouth nightly as needed for sleep Just for traveling    Insomnia       aspirin 81 MG chewable tablet      Take 81 mg by mouth daily        lisinopril 10 MG tablet    PRINIVIL/ZESTRIL    30 tablet    Take 1 tablet (10 mg) by mouth daily    Benign essential hypertension

## 2017-11-03 LAB
BUN SERPL-MCNC: 21 MG/DL (ref 8–27)
BUN/CREATININE RATIO: 24 (ref 10–24)
CALCIUM SERPL-MCNC: 9.2 MG/DL (ref 8.6–10.2)
CHLORIDE SERPLBLD-SCNC: 102 MMOL/L (ref 96–106)
CHOLEST SERPL-MCNC: 192 MG/DL (ref 100–199)
CREAT SERPL-MCNC: 0.87 MG/DL (ref 0.76–1.27)
EGFR IF AFRICN AM: 106 ML/MIN/1.73
EGFR IF NONAFRICN AM: 92 ML/MIN/1.73
GLUCOSE SERPL-MCNC: 93 MG/DL (ref 65–99)
HDLC SERPL-MCNC: 52 MG/DL
LDL/HDL RATIO: 2.5 RATIO UNITS (ref 0–3.6)
LDLC SERPL CALC-MCNC: 128 MG/DL (ref 0–99)
POTASSIUM SERPL-SCNC: 4.8 MMOL/L (ref 3.5–5.2)
SODIUM SERPL-SCNC: 141 MMOL/L (ref 134–144)
TOTAL CO2: 25 MMOL/L (ref 18–28)
TRIGL SERPL-MCNC: 61 MG/DL (ref 0–149)
VLDLC SERPL CALC-MCNC: 12 MG/DL (ref 5–40)

## 2017-12-04 ENCOUNTER — OFFICE VISIT (OUTPATIENT)
Dept: FAMILY MEDICINE | Facility: CLINIC | Age: 62
End: 2017-12-04

## 2017-12-04 VITALS
HEIGHT: 68 IN | HEART RATE: 57 BPM | BODY MASS INDEX: 31.37 KG/M2 | WEIGHT: 207 LBS | SYSTOLIC BLOOD PRESSURE: 110 MMHG | DIASTOLIC BLOOD PRESSURE: 80 MMHG | OXYGEN SATURATION: 98 %

## 2017-12-04 DIAGNOSIS — Z00.00 ROUTINE GENERAL MEDICAL EXAMINATION AT A HEALTH CARE FACILITY: Primary | ICD-10-CM

## 2017-12-04 DIAGNOSIS — G40.109 PARTIAL EPILEPSY (H): ICD-10-CM

## 2017-12-04 PROCEDURE — 99396 PREV VISIT EST AGE 40-64: CPT | Performed by: FAMILY MEDICINE

## 2017-12-04 ASSESSMENT — PATIENT HEALTH QUESTIONNAIRE - PHQ9: SUM OF ALL RESPONSES TO PHQ QUESTIONS 1-9: 5

## 2017-12-04 NOTE — MR AVS SNAPSHOT
"              After Visit Summary   12/4/2017    Darrel Bernal    MRN: [id number]           Patient Information     Date Of Birth          1955        Visit Information        Provider Department      12/4/2017 8:45 AM David Padron MD [facility]        Today's Diagnoses     Routine general medical examination at a health care facility    -  1    Partial epilepsy (H)          Care Instructions    Thanks for coming in to see me today!    Your next visit with us should be scheduled for Follow up in 6 months.    Listed next are the medical health Maintenance items we are tracking for your care and when they are next due (or overdue!)  Our goal is 100% \"up to date.\" Keep this list handy to call and schedule what you are due for in the future!    Health Maintenance Due   Topic Date Due     HEPATITIS C SCREENING  03/16/1972         Preventive Health Recommendations  Male Ages 50 - 64    Yearly exam:             See your health care provider every year in order to  o   Review health changes.   o   Discuss preventive care.    o   Review your medicines if your doctor has prescribed any.     Have a cholesterol test every 5 years, or more frequently if you are at risk for high cholesterol/heart disease.     Have a diabetes test (fasting glucose) every three years. If you are at risk for diabetes, you should have this test more often.     Have a colonoscopy at age 50, or have a yearly FIT test (stool test). These exams will check for colon cancer.      Talk with your health care provider about whether or not a prostate cancer screening test (PSA) is right for you.    You should be tested each year for STDs (sexually transmitted diseases), if you re at risk.     Shots: Get a flu shot each year. Get a tetanus shot every 10 years.     Nutrition:    Eat at least 5 servings of fruits and vegetables daily.     Eat whole-grain bread, whole-wheat pasta and brown rice instead of white grains and rice.     Talk to " "your provider about Calcium and Vitamin D.     Lifestyle    Exercise for at least 150 minutes a week (30 minutes a day, 5 days a week). This will help you control your weight and prevent disease.     Limit alcohol to one drink per day.     No smoking.     Wear sunscreen to prevent skin cancer.     See your dentist every six months for an exam and cleaning.     See your eye doctor every 1 to 2 years.            Follow-ups after your visit        Who to contact     If you have questions or need follow up information about today's clinic visit or your schedule please contact [facility] directly at [phone].  Normal or non-critical lab and imaging results will be communicated to you by [employer] I[employer], letter or phone within 4 business days after the clinic has received the results. If you do not hear from us within 7 days, please contact the clinic through [employer] or phone. If you have a critical or abnormal lab result, we will notify you by phone as soon as possible.  Submit refill requests through [employer] or call your pharmacy and they will forward the refill request to us. Please allow 3 business days for your refill to be completed.          Additional Information About Your Visit        MyChart Information     [employer]ility lets you send messages to your doctor, view your test results, renew your prescriptions, schedule appointments and more. To sign up, go to [email or web address] . Click on \"Log in\" on the left side of the screen, which will take you to the Welcome page. Then click on \"Sign up Now\" on the right side of the page.     You will be asked to enter the access code listed below, as well as some personal information. Please follow the directions to create your username and password.     Your access code is: [email or web address]  Expires: 1/15/2018  4:25 PM     Your access code will  in 90 days. If you need help or a new code, please call your [place] clinic or [phone].        Care EveryWhere ID " "    This is your Care EveryWhere ID. This could be used by other organizations to access your [facility] medical records  [id number]        Your Vitals Were     Pulse Height Pulse Oximetry BMI (Body Mass Index)          57 1.715 m (5' 7.5\") 98% 31.94 kg/m2         Blood Pressure from Last 3 Encounters:   12/04/17 110/80   11/02/17 124/88   10/17/17 (!) 144/108    Weight from Last 3 Encounters:   12/04/17 93.9 kg (207 lb)   11/02/17 94.5 kg (208 lb 6.4 oz)   10/17/17 95.8 kg (211 lb 3.2 oz)              Today, you had the following     No orders found for display       Primary Care Provider Office Phone # Fax #    David Padron [phone] [address]  [facility] [address]        Equal Access to Services     [facility]: Hadii aad ku hadasho Soomaali, waaxda luqadaha, qaybta kaalmada adeegyada, waxay idiin haymiriamn sherif kharaisa julio . So [facility] [phone].    ATENCIÓN: Si habla español, tiene a jerez disposición servicios gratuitos de asistencia lingüística. Llame al [phone].    We comply with applicable federal civil rights laws and [place] laws. We do not discriminate on the basis of race, color, national origin, age, disability, sex, sexual orientation, or gender identity.            Thank you!     Thank you for choosing [facility]  for your care. Our goal is always to provide you with excellent care. Hearing back from our patients is one way we can continue to improve our services. Please take a few minutes to complete the written survey that you may receive in the mail after your visit with us. Thank you!             Your Updated Medication List - Protect others around you: Learn how to safely use, store and throw away your medicines at www.disposemymeds.org.          This list is accurate as of: 12/4/17  9:48 AM.  Always use your most recent med list.                   Brand Name Dispense Instructions for use Diagnosis    AMBIEN 10 MG tablet   Generic " drug:  zolpidem     10 tablet    Take 10 mg by mouth nightly as needed for sleep Just for traveling    Insomnia       aspirin 81 MG chewable tablet      Take 81 mg by mouth daily        lisinopril 10 MG tablet    PRINIVIL/ZESTRIL    30 tablet    Take 1 tablet (10 mg) by mouth daily    Benign essential hypertension

## 2017-12-04 NOTE — PROGRESS NOTES
SUBJECTIVE:   CC: Darrel Bernal is an 62 year old male who presents for preventative health visit.     Healthy Habits:    Do you get at least three servings of calcium containing foods daily (dairy, green leafy vegetables, etc.)? yes    Amount of exercise or daily activities, outside of work: 1 day(s) per week    Problems taking medications regularly No    Medication side effects: No    Have you had an eye exam in the past two years? yes    Do you see a dentist twice per year? yes    Do you have sleep apnea, excessive snoring or daytime drowsiness?no          PROBLEMS TO ADD ON...    Today's PHQ-2 Score:   PHQ-2 ( 1999 [employer]) 9/11/2015 12/3/2013   Q1: Little interest or pleasure in doing things 0 0   Q2: Feeling down, depressed or hopeless 0 0   PHQ-2 Score 0 0         Abuse: Current or Past(Physical, Sexual or Emotional)- NO  Do you feel safe in your environment - Yes  Social History   Substance Use Topics     Smoking status: Never Smoker     Smokeless tobacco: Never Used     Alcohol use 6.0 oz/week     10 Glasses of wine per week          Standardized Alcohol Screening Questionnaire  AUDIT   Questions 0 1 2 3 4 Score   1. How often do you have a drink  containing alcohol? Never Monthly or less 2 to 4  times a  month 2 to 3  times a  week 4 or more  times a  week  4   2. How many drinks containing alcohol  do you have on a typical day when you are drinking? 1 or 2 3 or 4 5 or 6 7 to 9 10 or more  0   3. How often do you have more than five  or more drinks on one occasion? Never Less  than  monthly Monthly Weekly Daily or  almost  daily  0   4. How often during the last year have  you found that you were not able to stop drinking once you had started? Never Less  than  monthly Monthly Weekly Daily or  almost  daily  0   5. How often during the last year have  you failed to do what was normally expected of you because of drinking? Never Less  than  monthly Monthly Weekly Daily or  almost  daily  0   6. How often  during the last year have  you needed a first drink in the morning to get yourself going after a heavy drinking session? Never Less  than  monthly Monthly Weekly Daily or  almost  daily  0   7. How often during the last year have you had a feeling of guilt or remorse after drinking? Never Less  than  monthly Monthly Weekly Daily or  almost  daily  0   8. How often during the last year have  you been unable to remember what happened the night before because of your drinking? Never Less  than  monthly Monthly Weekly Daily or  almost  daily  0   9. Have you or someone else been  injured because of your drinking? No  Yes, but not in the last year  Yes,  during the  last year  0   10. Has a relative, friend, doctor or other health care worker been concerned about your drinking or suggested you cut down? No  Yes, but not in the last year  Yes,  during the  last year  0   Total  4   Scoring: A score of 7 for adult men is an indication of hazardous drinking (risk for physical or physiological harm); a score of 8 or more is an indication of an alcohol use disorder. A score of 5 or more for adult women  is an indication of hazardous drinking or an alcohol use disorder.         Last PSA: No results found for: PSA     Reviewed orders with patient. 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          [facility]  [facility]         Reviewed and updated as needed this visit by Provider        Past Medical History:   Diagnosis Date     Basal cell carcinoma, face 2009ish     Brain bleed (H) 2003     Lung nodules 2007    Followed for stability, and it was     Squamous cell carcinoma, face 2012ish     Visual field cut 2003    from 4 to 6 pm in both eyes.     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    Dr Dodge     CHOLECYSTECTOMY, LAPOROSCOPIC  2005ish    Cholecystectomy, Laparoscopic     REPAIR TENDON ACHILLES  1995    Left       ROS:  C: NEGATIVE for fever, chills, change in weight  INTEGUMENTARY/SKIN: POSITIVE for worried about recurrence of the facial lesion  E: NEGATIVE for vision changes or irritation  ENT: NEGATIVE for ear, mouth and throat problems  R: NEGATIVE for significant cough or SOB  CV: NEGATIVE for chest pain, palpitations or peripheral edema  GI: NEGATIVE for nausea, abdominal pain, heartburn, or change in bowel habits   male: negative for dysuria, hematuria, decreased urinary stream, erectile dysfunction, urethral discharge  M: NEGATIVE for significant arthralgias or myalgia  N: NEGATIVE for weakness, dizziness or paresthesias, no recent seizures  P: NEGATIVE for changes in mood or affect    OBJECTIVE:   /80  Pulse 57  Ht 1.715 m (5' 7.5\")  Wt 93.9 kg (207 lb)  SpO2 98%  BMI 31.94 kg/m2  EXAM:  GENERAL: healthy, alert and no distress  EYES: Eyes grossly normal to inspection, PERRL and conjunctivae and sclerae normal  HENT: ear canals and TM's normal, nose and mouth without ulcers or lesions  NECK: no adenopathy, no asymmetry, masses, or scars and thyroid normal to palpation  RESP: lungs clear to " "auscultation - no rales, rhonchi or wheezes  CV: regular rate and rhythm, normal S1 S2, no S3 or S4, no murmur, click or rub, no peripheral edema and peripheral pulses strong  ABDOMEN: soft, nontender, no hepatosplenomegaly, no masses and bowel sounds normal   (male): normal male genitalia without lesions or urethral discharge, no hernia  RECTAL: normal sphincter tone, no rectal masses, prostate normal size, smooth, nontender without nodules or masses  MS: no gross musculoskeletal defects noted, no edema  SKIN: no suspicious lesions or rashes  NEURO: Normal strength and tone, mentation intact and speech normal  PSYCH: mentation appears normal, affect normal/bright    ASSESSMENT/PLAN:   Darrel was seen today for physical.    Diagnoses and all orders for this visit:    Routine general medical examination at a health care facility    Partial epilepsy (H)      COUNSELING:  Reviewed preventive health counseling, as reflected in patient instructions       Regular exercise       Healthy diet/nutrition           reports that he has never smoked. He has never used smokeless tobacco.      Estimated body mass index is 31.94 kg/(m^2) as calculated from the following:    Height as of this encounter: 1.715 m (5' 7.5\").    Weight as of this encounter: 93.9 kg (207 lb).   Weight management plan: Discussed healthy diet and exercise guidelines and patient will follow up in 6 months in clinic to re-evaluate.    Counseling Resources:  ATP IV Guidelines  Pooled Cohorts Equation Calculator  FRAX Risk Assessment  ICSI Preventive Guidelines  Dietary Guidelines for Americans, 2010  USDA's MyPlate  ASA Prophylaxis  Lung CA Screening    David Padron MD  [facility]  "

## 2017-12-04 NOTE — PATIENT INSTRUCTIONS
"Thanks for coming in to see me today!    Your next visit with us should be scheduled for Follow up in 6 months.    Listed next are the medical health Maintenance items we are tracking for your care and when they are next due (or overdue!)  Our goal is 100% \"up to date.\" Keep this list handy to call and schedule what you are due for in the future!    Health Maintenance Due   Topic Date Due     HEPATITIS C SCREENING  03/16/1972         Preventive Health Recommendations  Male Ages 50   64    Yearly exam:             See your health care provider every year in order to  o   Review health changes.   o   Discuss preventive care.    o   Review your medicines if your doctor has prescribed any.     Have a cholesterol test every 5 years, or more frequently if you are at risk for high cholesterol/heart disease.     Have a diabetes test (fasting glucose) every three years. If you are at risk for diabetes, you should have this test more often.     Have a colonoscopy at age 50, or have a yearly FIT test (stool test). These exams will check for colon cancer.      Talk with your health care provider about whether or not a prostate cancer screening test (PSA) is right for you.    You should be tested each year for STDs (sexually transmitted diseases), if you re at risk.     Shots: Get a flu shot each year. Get a tetanus shot every 10 years.     Nutrition:    Eat at least 5 servings of fruits and vegetables daily.     Eat whole-grain bread, whole-wheat pasta and brown rice instead of white grains and rice.     Talk to your provider about Calcium and Vitamin D.     Lifestyle    Exercise for at least 150 minutes a week (30 minutes a day, 5 days a week). This will help you control your weight and prevent disease.     Limit alcohol to one drink per day.     No smoking.     Wear sunscreen to prevent skin cancer.     See your dentist every six months for an exam and cleaning.     See your eye doctor every 1 to 2 years.    "

## 2017-12-15 DIAGNOSIS — I10 BENIGN ESSENTIAL HYPERTENSION: ICD-10-CM

## 2017-12-15 RX ORDER — LISINOPRIL 10 MG/1
TABLET ORAL
Qty: 30 TABLET | Refills: 5 | Status: SHIPPED | OUTPATIENT
Start: 2017-12-15 | End: 2018-12-30

## 2017-12-15 NOTE — TELEPHONE ENCOUNTER
lisinopril (PRINIVIL/ZESTRIL) 10 MG   LAST  Med check 12/4/17   last labs(for RX) 11/2/17  Next  appt scheduled =  6/4/18 med ck  Janet James MA    BP Readings from Last 3 Encounters:   12/04/17 110/80   11/02/17 124/88   10/17/17 (!) 144/108     Last Basic Metabolic Panel:  Lab Results   Component Value Date     11/02/2017      Lab Results   Component Value Date    POTASSIUM 4.8 11/02/2017     Lab Results   Component Value Date    CHLORIDE 102 11/02/2017     Lab Results   Component Value Date    DAVID 9.2 11/02/2017     No results found for: CO2  Lab Results   Component Value Date    BUN 21 11/02/2017    BUN 24 11/02/2017     Lab Results   Component Value Date    CR 0.87 11/02/2017     Lab Results   Component Value Date    GLC 93 11/02/2017

## 2017-12-21 ENCOUNTER — TRANSFERRED RECORDS (OUTPATIENT)
Dept: FAMILY MEDICINE | Facility: CLINIC | Age: 62
End: 2017-12-21

## 2018-12-30 DIAGNOSIS — I10 BENIGN ESSENTIAL HYPERTENSION: ICD-10-CM

## 2018-12-30 RX ORDER — LISINOPRIL 10 MG/1
TABLET ORAL
Qty: 30 TABLET | Refills: 1 | Status: SHIPPED | OUTPATIENT
Start: 2018-12-30 | End: 2019-03-21

## 2019-01-16 DIAGNOSIS — I10 BENIGN ESSENTIAL HYPERTENSION: ICD-10-CM

## 2019-01-16 RX ORDER — LISINOPRIL 10 MG/1
10 TABLET ORAL DAILY
Qty: 90 TABLET | Refills: 0 | Status: SHIPPED | OUTPATIENT
Start: 2019-01-16 | End: 2019-03-21

## 2019-01-16 NOTE — TELEPHONE ENCOUNTER
lisinopril---last seen 12/4/17 for a cpx, patient is due for an appointment.  Left message to call the clinic to schedule.

## 2019-01-31 ENCOUNTER — OFFICE VISIT (OUTPATIENT)
Dept: FAMILY MEDICINE | Facility: CLINIC | Age: 64
End: 2019-01-31

## 2019-01-31 VITALS
BODY MASS INDEX: 31.34 KG/M2 | WEIGHT: 206.8 LBS | HEART RATE: 56 BPM | RESPIRATION RATE: 16 BRPM | HEIGHT: 68 IN | SYSTOLIC BLOOD PRESSURE: 138 MMHG | DIASTOLIC BLOOD PRESSURE: 86 MMHG

## 2019-01-31 DIAGNOSIS — Z12.5 SCREENING FOR PROSTATE CANCER: ICD-10-CM

## 2019-01-31 DIAGNOSIS — M79.675 PAIN OF TOE OF LEFT FOOT: ICD-10-CM

## 2019-01-31 DIAGNOSIS — Z11.59 NEED FOR HEPATITIS C SCREENING TEST: Primary | ICD-10-CM

## 2019-01-31 DIAGNOSIS — Z13.6 SCREENING FOR HEART DISEASE: ICD-10-CM

## 2019-01-31 DIAGNOSIS — I10 ESSENTIAL HYPERTENSION: ICD-10-CM

## 2019-01-31 LAB
% GRANULOCYTES: 73 % (ref 42.2–75.2)
HCT VFR BLD AUTO: 47.2 % (ref 39–51)
HEMOGLOBIN: 15.8 G/DL (ref 13.4–17.5)
LYMPHOCYTES NFR BLD AUTO: 20.2 % (ref 20.5–51.1)
MCH RBC QN AUTO: 30.2 PG (ref 27–31)
MCHC RBC AUTO-ENTMCNC: 33.4 G/DL (ref 33–37)
MCV RBC AUTO: 90.2 FL (ref 80–100)
MONOCYTES NFR BLD AUTO: 6.8 % (ref 1.7–9.3)
PLATELET # BLD AUTO: 332 K/UL (ref 140–450)
RBC # BLD AUTO: 5.23 X10/CMM (ref 4.2–5.9)
WBC # BLD AUTO: 6.8 X10/CMM (ref 3.8–11)

## 2019-01-31 PROCEDURE — 84153 ASSAY OF PSA TOTAL: CPT | Mod: 90 | Performed by: FAMILY MEDICINE

## 2019-01-31 PROCEDURE — 99214 OFFICE O/P EST MOD 30 MIN: CPT | Performed by: FAMILY MEDICINE

## 2019-01-31 PROCEDURE — 80053 COMPREHEN METABOLIC PANEL: CPT | Mod: 90 | Performed by: FAMILY MEDICINE

## 2019-01-31 PROCEDURE — 85025 COMPLETE CBC W/AUTO DIFF WBC: CPT | Performed by: FAMILY MEDICINE

## 2019-01-31 PROCEDURE — 80061 LIPID PANEL: CPT | Mod: 90 | Performed by: FAMILY MEDICINE

## 2019-01-31 PROCEDURE — 84550 ASSAY OF BLOOD/URIC ACID: CPT | Mod: 90 | Performed by: FAMILY MEDICINE

## 2019-01-31 PROCEDURE — 86803 HEPATITIS C AB TEST: CPT | Mod: 90 | Performed by: FAMILY MEDICINE

## 2019-01-31 PROCEDURE — 36415 COLL VENOUS BLD VENIPUNCTURE: CPT | Performed by: FAMILY MEDICINE

## 2019-01-31 ASSESSMENT — MIFFLIN-ST. JEOR: SCORE: 1702.54

## 2019-01-31 NOTE — PROGRESS NOTES
Blood presure remains well controlled when checked out of clinic.    Reviewed last 6 BP readings in chart:  BP Readings from Last 6 Encounters:   01/31/19 138/86   12/04/17 110/80   11/02/17 124/88   10/17/17 (!) 144/108   10/15/15 120/83   09/11/15 (!) 160/100       he has not experienced any significant side effects from medications for hypertension.    NO active cardiac complaints or symptoms with exe    Toe pain on the left   Brother has gout.    Problem(s) Oriented visit      ROS:  General and CV completed and negative except as noted above     HISTORY:   reports that he drinks about 6.0 oz of alcohol per week.   reports that  has never smoked. he has never used smokeless tobacco.    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    EXAM:  APPEARANCE: = Relaxed and in no distress  Conj/Eyelids = noninjected and lids and lashes are without inflammation  PERRLA/Irises = Pupils Round Reactive to Light and Irisis without inflammation  Neck = No anterior or posterior adenopathy appreciated.  Thyroid = Not enlarged and no masses felt  Resp effort = Calm regular breathing  Breath Sounds = Good air movement with no rales or rhonchi in any lung fields  Heart Rate, Rythym, & sounds (no Murm)  = Regular rate and rythym with no S3, S4, or murmer appreciated.  Carotid Art's = Pulses full and equal and no  "bruits appreciated  Abdomen = Soft, nontender, no masses, & bowel sounds in all quadrants  Liver/Spleen = Normal span and no splenomegaly noted  Digits and Nails = FROM in all finger joints, no nail dystrophy  Ext (edema) = No pretibial edema noted or elsewhere  Musculsktl =  Strength and ROM of major joints are within normal limits  SKIN = absent significant rashes or lesions   Recent/Remote Memory = Alert and Oriented x 3  Mood/Affect = Cooperative and interested      Assessment/Plan:  Darrel was seen today for recheck medication and hypertension.    Diagnoses and all orders for this visit:    Need for hepatitis C screening test  -     HCV Antibody ([facility])    Essential hypertension  -     CBC with Diff/Plt ([facility])    Screening for prostate cancer  -     PSA Serum ([facility])    Screening for heart disease  -     Comp. Metabolic Panel (14) ([facility])  -     Lipid Panel ([facility])    Pain of toe of left foot  -     Uric Acid  Serum ([facility])        COUNSELING:   reports that  has never smoked. he has never used smokeless tobacco.    Estimated body mass index is 31.44 kg/m  as calculated from the following:    Height as of this encounter: 1.727 m (5' 8\").    Weight as of this encounter: 93.8 kg (206 lb 12.8 oz).       Appropriate preventive services were discussed with this patient, including applicable screening as appropriate for cardiovascular disease, diabetes, osteopenia/osteoporosis, and glaucoma.  As appropriate for age/gender, discussed screening for colorectal cancer, prostate cancer, breast cancer, and cervical cancer. Checklist reviewing preventive services available has been given to the patient.    Reviewed patients plan of care and provided an AVS. The Basic Care Plan (routine screening as documented in Health Maintenance) for Darrel meets the Care Plan requirement. This Care Plan has been established and reviewed with the  Patient.      The following health maintenance items are reviewed in Epic and correct " as of today:  Health Maintenance   Topic Date Due     HIV SCREEN (SYSTEM ASSIGNED)  03/16/1972     HEPATITIS C SCREENING  03/16/1972     ZOSTER IMMUNIZATION (1 of 2) 03/16/2004     ADVANCE DIRECTIVE PLANNING Q5 YRS  12/03/2018     PHQ-2 Q1 YR  12/04/2018     LIPID SCREEN Q5 YR MALE (SYSTEM ASSIGNED)  11/02/2022     COLONOSCOPY Q10 YR  10/19/2025     DTAP/TDAP/TD IMMUNIZATION (2 - Td) 10/17/2027     INFLUENZA VACCINE  Completed     IPV IMMUNIZATION  Aged Out     MENINGITIS IMMUNIZATION  Aged Out       David Padron  [facility]  For any issues my office # is [phone]

## 2019-01-31 NOTE — LETTER
[facility] [address] [place]  [address]  Phone: [phone]    February 1, 2019      Darrel Bernal  [address] [place] [id number]              Dear Darrel,    I am writing to report that your included test results are within expected ranges. I do not suggest that we make any changes at this time.        Sincerely,     David Padron M.D.    Results for orders placed or performed in visit on 01/31/19   HCV Antibody ([facility])   Result Value Ref Range    Hep C Virus Ab <0.1 0.0 - 0.9 s/co ratio    Narrative    Performed at:  01 - [facility] [address], [place], [place]  [id number]  : Devaughn Rizzo MD, Phone:  [phone]   Comp. Metabolic Panel (14) ([facility])   Result Value Ref Range    Glucose 94 65 - 99 mg/dL    Urea Nitrogen 19 8 - 27 mg/dL    Creatinine 0.95 0.76 - 1.27 mg/dL    eGFR If NonAfricn Am 84 >59 mL/min/1.73    eGFR If Africn Am 97 >59 mL/min/1.73    BUN/Creatinine Ratio 20 10 - 24    Sodium 140 134 - 144 mmol/L    Potassium 4.9 3.5 - 5.2 mmol/L    Chloride 101 96 - 106 mmol/L    Total CO2 25 20 - 29 mmol/L    Calcium 9.5 8.6 - 10.2 mg/dL    Protein Total 7.5 6.0 - 8.5 g/dL    Albumin 4.5 3.6 - 4.8 g/dL    Globulin, Total 3.0 1.5 - 4.5 g/dL    A/G Ratio 1.5 1.2 - 2.2    Bilirubin Total 0.7 0.0 - 1.2 mg/dL    Alkaline Phosphatase 86 39 - 117 IU/L    AST 28 0 - 40 IU/L    ALT 29 0 - 44 IU/L    Narrative    Performed at:  01 - [facility] [address], [place], [place]  [id number]  : Devaughn Rizzo MD, Phone:  [phone]   Lipid Panel ([facility])   Result Value Ref Range    Cholesterol 202 (H) 100 - 199 mg/dL    Triglycerides 59 0 - 149 mg/dL    HDL Cholesterol 61 >39 mg/dL    VLDL Cholesterol Andrew 12 5 - 40 mg/dL    LDL Cholesterol Calculated 129 (H) 0 - 99 mg/dL    LDL/HDL Ratio 2.1 0.0 - 3.6 ratio    Narrative    Performed at:  01 - [facility] [address], [place], [place]  [id number]  : Devaughn Rizzo MD, Phone:  [phone]   CBC  with Diff/Plt (G)   Result Value Ref Range    WBC x10/cmm 6.8 3.8 - 11.0 x10/cmm    % Lymphocytes 20.2 (A) 20.5 - 51.1 %    % Monocytes 6.8 1.7 - 9.3 %    % Granulocytes 73.0 42.2 - 75.2 %    RBC x10/cmm 5.23 4.2 - 5.9 x10/cmm    Hemoglobin 15.8 13.4 - 17.5 g/dl    Hematocrit 47.2 39 - 51 %    MCV 90.2 80 - 100 fL    MCH 30.2 27.0 - 31.0 pg    MCHC 33.4 33.0 - 37.0 g/dL    Platelet Count 332 140 - 450 K/uL   PSA Serum ([facility])   Result Value Ref Range    PSA NG/ML 1.6 0.0 - 4.0 ng/mL    Narrative    Performed at:  01 - [facility] [address], [place], [place]  [id number]  : Devaughn Rizzo MD, Phone:  [phone]   Uric Acid  Serum ([facility])   Result Value Ref Range    Uric Acid 6.4 3.7 - 8.6 mg/dL    Narrative    Performed at:  01 - [facility] [address], [place], [place]  [id number]  : Devaughn Rizzo MD, Phone:  [phone]

## 2019-02-01 LAB
ALBUMIN SERPL-MCNC: 4.5 G/DL (ref 3.6–4.8)
ALBUMIN/GLOB SERPL: 1.5 {RATIO} (ref 1.2–2.2)
ALP SERPL-CCNC: 86 IU/L (ref 39–117)
ALT SERPL-CCNC: 29 IU/L (ref 0–44)
AST SERPL-CCNC: 28 IU/L (ref 0–40)
BILIRUB SERPL-MCNC: 0.7 MG/DL (ref 0–1.2)
BUN SERPL-MCNC: 19 MG/DL (ref 8–27)
BUN/CREATININE RATIO: 20 (ref 10–24)
CALCIUM SERPL-MCNC: 9.5 MG/DL (ref 8.6–10.2)
CHLORIDE SERPLBLD-SCNC: 101 MMOL/L (ref 96–106)
CHOLEST SERPL-MCNC: 202 MG/DL (ref 100–199)
CREAT SERPL-MCNC: 0.95 MG/DL (ref 0.76–1.27)
EGFR IF AFRICN AM: 97 ML/MIN/1.73
EGFR IF NONAFRICN AM: 84 ML/MIN/1.73
GLOBULIN, TOTAL: 3 G/DL (ref 1.5–4.5)
GLUCOSE SERPL-MCNC: 94 MG/DL (ref 65–99)
HCV AB SERPL QL IA: <0.1 S/CO RATIO (ref 0–0.9)
HDLC SERPL-MCNC: 61 MG/DL
LDL/HDL RATIO: 2.1 RATIO (ref 0–3.6)
LDLC SERPL CALC-MCNC: 129 MG/DL (ref 0–99)
POTASSIUM SERPL-SCNC: 4.9 MMOL/L (ref 3.5–5.2)
PROT SERPL-MCNC: 7.5 G/DL (ref 6–8.5)
PSA NG/ML: 1.6 NG/ML (ref 0–4)
SODIUM SERPL-SCNC: 140 MMOL/L (ref 134–144)
TOTAL CO2: 25 MMOL/L (ref 20–29)
TRIGL SERPL-MCNC: 59 MG/DL (ref 0–149)
URATE SERPL-MCNC: 6.4 MG/DL (ref 3.7–8.6)
VLDLC SERPL CALC-MCNC: 12 MG/DL (ref 5–40)

## 2019-03-21 ENCOUNTER — OFFICE VISIT (OUTPATIENT)
Dept: FAMILY MEDICINE | Facility: CLINIC | Age: 64
End: 2019-03-21

## 2019-03-21 VITALS
OXYGEN SATURATION: 97 % | BODY MASS INDEX: 31.64 KG/M2 | DIASTOLIC BLOOD PRESSURE: 88 MMHG | HEIGHT: 68 IN | WEIGHT: 208.8 LBS | SYSTOLIC BLOOD PRESSURE: 138 MMHG | HEART RATE: 75 BPM | RESPIRATION RATE: 16 BRPM

## 2019-03-21 DIAGNOSIS — Z00.00 MEDICARE ANNUAL WELLNESS VISIT, SUBSEQUENT: Primary | ICD-10-CM

## 2019-03-21 DIAGNOSIS — K21.00 GASTROESOPHAGEAL REFLUX DISEASE WITH ESOPHAGITIS: ICD-10-CM

## 2019-03-21 DIAGNOSIS — I10 BENIGN ESSENTIAL HYPERTENSION: ICD-10-CM

## 2019-03-21 DIAGNOSIS — G40.109 PARTIAL EPILEPSY (H): ICD-10-CM

## 2019-03-21 DIAGNOSIS — Z23 NEED FOR VACCINATION: ICD-10-CM

## 2019-03-21 PROCEDURE — 99397 PER PM REEVAL EST PAT 65+ YR: CPT | Mod: 25 | Performed by: FAMILY MEDICINE

## 2019-03-21 PROCEDURE — 90670 PCV13 VACCINE IM: CPT | Performed by: FAMILY MEDICINE

## 2019-03-21 PROCEDURE — 90471 IMMUNIZATION ADMIN: CPT | Performed by: FAMILY MEDICINE

## 2019-03-21 RX ORDER — LISINOPRIL 10 MG/1
10 TABLET ORAL DAILY
Qty: 90 TABLET | Refills: 3 | Status: SHIPPED | OUTPATIENT
Start: 2019-03-21 | End: 2019-03-21

## 2019-03-21 RX ORDER — LISINOPRIL 10 MG/1
TABLET ORAL
Qty: 90 TABLET | Refills: 3 | Status: SHIPPED | OUTPATIENT
Start: 2019-03-21 | End: 2020-03-01

## 2019-03-21 ASSESSMENT — MIFFLIN-ST. JEOR: SCORE: 1706.61

## 2019-03-21 NOTE — PATIENT INSTRUCTIONS
Preventive Health Recommendations:     See your health care provider every year to    Review health changes.     Discuss preventive care.      Review your medicines if your doctor has prescribed any.    Talk with your health care provider about whether you should have a test to screen for prostate cancer (PSA).    Every 3 years, have a diabetes test (fasting glucose). If you are at risk for diabetes, you should have this test more often.    Every 5 years, have a cholesterol test. Have this test more often if you are at risk for high cholesterol or heart disease.     Every 10 years, have a colonoscopy. Or, have a yearly FIT test (stool test). These exams will check for colon cancer.    Talk to with your health care provider about screening for Abdominal Aortic Aneurysm if you have a family history of AAA or have a history of smoking.  Shots:     Get a flu shot each year.     Get a tetanus shot every 10 years.     Talk to your doctor about your pneumonia vaccines. There are now two you should receive - Pneumovax (PPSV 23) and Prevnar (PCV 13).    Talk to your pharmacist about a shingles vaccine.     Talk to your doctor about the hepatitis B vaccine.  Nutrition:     Eat at least 5 servings of fruits and vegetables each day.     Eat whole-grain bread, whole-wheat pasta and brown rice instead of white grains and rice.     Get adequate Calcium and Vitamin D.   Lifestyle    Exercise for at least 150 minutes a week (30 minutes a day, 5 days a week). This will help you control your weight and prevent disease.     Limit alcohol to one drink per day.     No smoking.     Wear sunscreen to prevent skin cancer.     See your dentist every six months for an exam and cleaning.     See your eye doctor every 1 to 2 years to screen for conditions such as glaucoma, macular degeneration and cataracts.    Personalized Prevention Plan  You are due for the preventive services outlined below.  Your care team is available to assist you in  scheduling these services.  If you have already completed any of these items, please share that information with your care team to update in your medical record.    Health Maintenance Due   Topic Date Due     HIV SCREEN (SYSTEM ASSIGNED)  03/16/1972     Zoster (Shingles) Vaccine (1 of 2) 03/16/2004     Discuss Advance Directive Planning  12/03/2018     AORTIC ANEURYSM SCREENING (SYSTEM ASSIGNED)  03/16/2019     Annual Wellness Visit  03/16/2019     FALL RISK ASSESSMENT  03/16/2019

## 2019-03-21 NOTE — PROGRESS NOTES
"  SUBJECTIVE:   Darrel Bernal is a 64 year old male who presents for Preventive Visit.    Are you in the first 12 months of your Medicare Part B coverage?  No    Physical Health:    In general, how would you rate your overall physical health? good    Outside of work, how many days during the week do you exercise? none    Outside of work, approximately how many minutes a day do you exercise?not applicable    If you drink alcohol do you typically have >3 drinks per day or >7 drinks per week? No    Do you usually eat at least 4 servings of fruit and vegetables a day, include whole grains & fiber and avoid regularly eating high fat or \"junk\" foods? no    Do you have any problems taking medications regularly?  No    Do you have any side effects from medications? none    Needs assistance for the following daily activities: no assistance needed    Which of the following safety concerns are present in your home?  none identified     Hearing impairment: No-hearing pass on both ears    In the past 6 months, have you been bothered by leaking of urine? yes    Mental Health:    In general, how would you rate your overall mental or emotional health? good  PHQ-2 Score:      Do you feel safe in your environment? Yes    Do you have a Health Care Directive?no    Additional concerns to address?  No    Fall risk:  Fallen 2 or more times in the past year?: No  Any fall with injury in the past year?: No  click delete button to remove this line now  Cognitive Screenin) Repeat 3 items (Leader, Season, Table)    2) Clock draw: NORMAL  3) 3 item recall: Recalls 2 objects   Results: NORMAL clock, 1-2 items recalled: COGNITIVE IMPAIRMENT LESS LIKELY    Mini-CogTM Copyright JEAN De. Licensed by the author for use in [facility]; reprinted with permission (soniya@.[email or web address]). All rights reserved.      Do you have sleep apnea, excessive snoring or daytime drowsiness?: no        PROBLEMS TO ADD " ON...  -------------------------------------    Reviewed and updated as needed this visit by clinical staff  Tobacco  Allergies  Meds         Reviewed and updated as needed this visit by Provider        Social History     Tobacco Use     Smoking status: Never Smoker     Smokeless tobacco: Never Used   Substance Use Topics     Alcohol use: Yes     Alcohol/week: 6.0 oz     Types: 10 Glasses of wine per week                           Current providers sharing in care for this patient include:   Patient Care Team:  David Padron MD as PCP - General (Family Practice)  David Padron MD as Assigned PCP    The following health maintenance items are reviewed in Epic and correct as of today:  Health Maintenance   Topic Date Due     HIV SCREEN (SYSTEM ASSIGNED)  03/16/1972     ZOSTER IMMUNIZATION (1 of 2) 03/16/2004     ADVANCE DIRECTIVE PLANNING Q5 YRS  12/03/2018     AORTIC ANEURYSM SCREENING (SYSTEM ASSIGNED)  03/16/2019     MEDICARE ANNUAL WELLNESS VISIT  03/16/2019     FALL RISK ASSESSMENT  03/16/2019     PHQ-2 Q1 YR  01/31/2020     LIPID SCREEN Q5 YR MALE (SYSTEM ASSIGNED)  01/31/2024     COLONOSCOPY Q10 YR  10/19/2025     DTAP/TDAP/TD IMMUNIZATION (3 - Td) 10/17/2027     INFLUENZA VACCINE  Completed     HEPATITIS C SCREENING  Completed     IPV IMMUNIZATION  Aged Out     MENINGITIS IMMUNIZATION  Aged Out     Patient Active Problem List   Diagnosis     Advanced directives, counseling/discussion     Health Care Home     Visual field cut     Rosacea     Congenital anomaly of cerebrovascular system     Esophageal reflux     Partial epilepsy (H)     Squamous cell carcinoma of skin of face     Basal cell carcinoma of skin     Past Surgical History:   Procedure Laterality Date     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      Pneumonia Vaccine:Adults age 65+ who received Pneumovax (PPSV23) at 65 years or older: Should be given PCV13 > 1 year after their most recent PPSV23 Adults age 65+ who have not received previous Pneumovax (PPSV23) or PCV13 as an adult: Should first be given PCV13 AND then should be given PPSV23 6-12 months after PCV13    ROS:  Constitutional, HEENT, cardiovascular, pulmonary, GI, , musculoskeletal, neuro, skin, endocrine and psych systems are negative, except as otherwise noted.    OBJECTIVE:   /88   Pulse 75   Resp 16   Ht 1.727 m (5' 8\")   Wt 94.7 kg (208 lb 12.8 oz)   SpO2 97%   BMI 31.75 kg/m   Estimated body mass index is 31.75 kg/m  as calculated from the following:    Height as of this encounter: 1.727 m (5' 8\").    Weight as of this encounter: 94.7 kg (208 lb 12.8 oz).  EXAM:   GENERAL: healthy, alert and no distress  EYES: Eyes grossly normal to inspection, PERRL and conjunctivae and sclerae normal  HENT: ear canals and TM's normal, nose and mouth without ulcers or lesions  NECK: no adenopathy, no asymmetry, masses, or scars and thyroid normal to palpation  RESP: lungs clear to auscultation - no rales, rhonchi or wheezes  CV: regular rate and rhythm, normal S1 S2, no S3 or S4, no murmur, click or rub, no peripheral edema and peripheral pulses strong  ABDOMEN: soft, nontender, no hepatosplenomegaly, no masses and bowel sounds normal   (male): normal male genitalia without lesions or urethral discharge, no hernia  RECTAL: normal sphincter tone, no rectal masses, prostate normal size, smooth, nontender without nodules or masses  MS: no gross musculoskeletal defects noted, no edema  SKIN: no suspicious lesions or rashes  NEURO: Normal strength and tone, mentation intact and speech normal  PSYCH: mentation appears " normal, affect normal/bright    Diagnostic Test Results:  none     ASSESSMENT / PLAN:   Darrel was seen today for physical.    Diagnoses and all orders for this visit:    Medicare annual wellness visit, subsequent    Partial epilepsy (H)    Gastroesophageal reflux disease with esophagitis    Benign essential hypertension  -     lisinopril (PRINIVIL/ZESTRIL) 10 MG tablet; Take 1 tablet (10 mg) by mouth daily Take 1 tablet by mouth daily.  No more refills until patient is seen in clinic    Need for vaccination  -     PNEUMOCOCCAL CONJ VACCINE 13 VALENT IM      Blood presure remains well controlled when checked out of clinic.    Reviewed last 6 BP readings in chart:  BP Readings from Last 6 Encounters:   03/21/19 138/88   01/31/19 138/86   12/04/17 110/80   11/02/17 124/88   10/17/17 (!) 144/108   10/15/15 120/83       he has not experienced any significant side effects from medications for hypertension.    NO active cardiac complaints or symptoms with exe    Toe pain on the left   Brother has gout.    Problem(s) Oriented visit      ROS:  General and CV completed and negative except as noted above     HISTORY:   reports that he drinks about 6.0 oz of alcohol per week.   reports that  has never smoked. he has never used smokeless tobacco.    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    EXAM:  APPEARANCE: = Relaxed and in no distress  Conj/Eyelids = noninjected and lids and lashes are without inflammation  PERRLA/Irises = Pupils Round Reactive to Light and Irisis without inflammation  Neck = No anterior or posterior adenopathy appreciated.  Thyroid = Not enlarged and no masses felt  Resp effort = Calm regular breathing  Breath Sounds = Good air movement with no rales or rhonchi in any lung fields  Heart Rate, Rythym, & sounds (no Murm)  = Regular rate and rythym with no S3, S4, or murmer appreciated.  Carotid Art's = Pulses full and equal and no bruits appreciated  Abdomen = Soft, nontender, no masses, & bowel sounds in all quadrants  Liver/Spleen = Normal span and no splenomegaly noted  Digits and Nails = FROM in all finger joints, no nail dystrophy  Ext (edema) = No pretibial edema noted or elsewhere  Musculsktl =  Strength and ROM of major joints are within normal limits  SKIN = absent significant rashes or lesions   Recent/Remote Memory = Alert and Oriented x 3  Mood/Affect = Cooperative and interested      Assessment/Plan:  Darrel was seen today for physical.    Diagnoses and all orders for this visit:    Annual preventative visit,    Partial epilepsy (H)  Has decided that     Gastroesophageal reflux disease with esophagitis  Discussed the functional nature of this condition regarding what they eat, how they eat, when they eat, and how much they eat as all contributing to gastroesophageal symptoms.    Recommend anti-reflux diet and eating patterns emphasizing smaller more frequent meals and timing relative to bedtime.  Also recommend avoiding tomatoes, onions, spicy foods, caffeine, or any other food/beverage that cause increased reflux.    If symptoms not controlled on current therapies, then need to return for further evaluation and consideration of further studies.    Benign essential hypertension  Discussed current hypertension treatment guidelines, including indications for  "treatment and treatment options.  Discussed the importance for aggressive management of HTN to prevent vascular complications later.  Recommended lower fat, lower carbohydrate, and lower sodium (<2000 mg)diet.  Discussed required intervals for follow up on HTN, lab studies.  Recommened pt. follow their blood pressures outside the clinic to ensure that BPs are remaining within guidelines, and to contact me if the readings are not within guidelines on a regular basis so we can adjust treatment as needed.    -     lisinopril (PRINIVIL/ZESTRIL) 10 MG tablet; Take 1 tablet (10 mg) by mouth daily Take 1 tablet by mouth daily.  No more refills until patient is seen in clinic    Need for vaccination  -     PNEUMOCOCCAL CONJ VACCINE 13 VALENT IM        COUNSELING:   reports that  has never smoked. he has never used smokeless tobacco.    Estimated body mass index is 31.75 kg/m  as calculated from the following:    Height as of this encounter: 1.727 m (5' 8\").    Weight as of this encounter: 94.7 kg (208 lb 12.8 oz).       Appropriate preventive services were discussed with this patient, including applicable screening as appropriate for cardiovascular disease, diabetes, osteopenia/osteoporosis, and glaucoma.  As appropriate for age/gender, discussed screening for colorectal cancer, prostate cancer, breast cancer, and cervical cancer. Checklist reviewing preventive services available has been given to the patient.    Reviewed patients plan of care and provided an AVS. The Basic Care Plan (routine screening as documented in Health Maintenance) for Darrel meets the Care Plan requirement. This Care Plan has been established and reviewed with the  Patient.      The following health maintenance items are reviewed in Epic and correct as of today:  Health Maintenance   Topic Date Due     HIV SCREEN (SYSTEM ASSIGNED)  03/16/1972     ZOSTER IMMUNIZATION (1 of 2) 03/16/2004     ADVANCE DIRECTIVE PLANNING Q5 YRS  12/03/2018     AORTIC " "ANEURYSM SCREENING (SYSTEM ASSIGNED)  03/16/2019     MEDICARE ANNUAL WELLNESS VISIT  03/16/2019     FALL RISK ASSESSMENT  03/16/2019     PHQ-2 Q1 YR  01/31/2020     LIPID SCREEN Q5 YR MALE (SYSTEM ASSIGNED)  01/31/2024     COLONOSCOPY Q10 YR  10/19/2025     DTAP/TDAP/TD IMMUNIZATION (3 - Td) 10/17/2027     INFLUENZA VACCINE  Completed     HEPATITIS C SCREENING  Completed     IPV IMMUNIZATION  Aged Out     MENINGITIS IMMUNIZATION  Aged Out       David Riggins Vito  [facility]  For any issues my office # is [phone]            End of Life Planning:  Patient currently has an advanced directive: Yes.  Practitioner is supportive of decision.    COUNSELING:  Reviewed preventive health counseling, as reflected in patient instructions       Vision screening       Hearing screening    BP Readings from Last 1 Encounters:   03/21/19 138/88     Estimated body mass index is 31.75 kg/m  as calculated from the following:    Height as of this encounter: 1.727 m (5' 8\").    Weight as of this encounter: 94.7 kg (208 lb 12.8 oz).    90  Weight management plan: Discussed healthy diet and exercise guidelines     reports that  has never smoked. he has never used smokeless tobacco.      Appropriate preventive services were discussed with this patient, including applicable screening as appropriate for cardiovascular disease, diabetes, osteopenia/osteoporosis, and glaucoma.  As appropriate for age/gender, discussed screening for colorectal cancer, prostate cancer, breast cancer, and cervical cancer. Checklist reviewing preventive services available has been given to the patient.    Reviewed patients plan of care and provided an AVS. The Basic Care Plan (routine screening as documented in Health Maintenance) for Darrel meets the Care Plan requirement. This Care Plan has been established and reviewed with the Patient.    Counseling Resources:  ATP IV Guidelines  Pooled Cohorts Equation Calculator  Breast Cancer Risk " Calculator  FRAX Risk Assessment  ICSI Preventive Guidelines  Dietary Guidelines for Americans, 2010  USDA's MyPlate  ASA Prophylaxis  Lung CA Screening    David Padron MD  [facility]

## 2019-06-11 DIAGNOSIS — G47.9 SLEEP DISORDER: Primary | ICD-10-CM

## 2019-06-11 DIAGNOSIS — G47.00 INSOMNIA: ICD-10-CM

## 2019-06-11 RX ORDER — ZOLPIDEM TARTRATE 10 MG/1
10 TABLET ORAL
Qty: 10 TABLET | Refills: 0 | Status: SHIPPED | OUTPATIENT
Start: 2019-06-11 | End: 2022-08-26

## 2019-06-12 NOTE — TELEPHONE ENCOUNTER
Rx did not print out in the office, call Rx into pharmacy.    Fortunato Ruggiero,   [facility]  [phone]

## 2020-02-28 DIAGNOSIS — I10 BENIGN ESSENTIAL HYPERTENSION: ICD-10-CM

## 2020-03-01 RX ORDER — LISINOPRIL 10 MG/1
TABLET ORAL
Qty: 90 TABLET | Refills: 4 | Status: SHIPPED | OUTPATIENT
Start: 2020-03-01 | End: 2021-05-24

## 2021-05-24 DIAGNOSIS — I10 BENIGN ESSENTIAL HYPERTENSION: ICD-10-CM

## 2021-05-24 RX ORDER — LISINOPRIL 10 MG/1
TABLET ORAL
Qty: 90 TABLET | Refills: 3 | Status: SHIPPED | OUTPATIENT
Start: 2021-05-24 | End: 2022-04-14

## 2021-05-27 ENCOUNTER — RECORDS - HEALTHEAST (OUTPATIENT)
Dept: ADMINISTRATIVE | Facility: CLINIC | Age: 66
End: 2021-05-27

## 2021-07-04 ENCOUNTER — HEALTH MAINTENANCE LETTER (OUTPATIENT)
Age: 66
End: 2021-07-04

## 2021-07-30 NOTE — PATIENT INSTRUCTIONS
Hypertension   What is hypertension?   Hypertension is blood pressure that keeps being higher than normal. Blood pressure is the force of blood against artery walls as the heart pumps blood through the body. Blood pressure can be unhealthy if it is above 120/80. The higher your blood pressure, the greater the health risk.   High blood pressure can be controlled if you take these steps:   Maintain a healthy weight.   Are physically active.   Follow a healthy eating plan, which includes foods that do not have a lot of salt and sodium.   Do not drink a lot of alcohol.   Our goal is to keep the systolic (top) number 128 or lower and the diastolic (bottom) number 83 or lower      Personalized Prevention Plan  You are due for the preventive services outlined below.  Your care team is available to assist you in scheduling these services.  If you have already completed any of these items, please share that information with your care team to update in your medical record.  Health Maintenance Due   Topic Date Due     Discuss Advance Care Planning  12/03/2018     AORTIC ANEURYSM SCREENING (SYSTEM ASSIGNED)  Never done     FALL RISK ASSESSMENT  03/21/2020     Pneumococcal Vaccine (2 of 2 - PPSV23) 03/21/2020     PHQ-2  01/01/2021

## 2021-07-30 NOTE — PROGRESS NOTES
"  SUBJECTIVE:   Darrel Bernal is a 66 year old male who presents for Preventive Visit.    {Split Bill scripting  The purpose of this visit is to discuss your medical history and prevent health problems before you are sick. You may be responsible for a co-pay, coinsurance, or deductible if your visit today includes services such as checking on a sore throat, having an x-ray or lab test, or treating and evaluating a new or existing condition :942615}  Patient has been advised of split billing requirements and indicates understanding: {Yes and No:771118}  Are you in the first 12 months of your Medicare Part B coverage?  { :757505::\"No\"}    Physical Health:    In general, how would you rate your overall physical health? { :796765}    Outside of work, how many days during the week do you exercise? { :981609}    Outside of work, approximately how many minutes a day do you exercise?{ :279169}    If you drink alcohol do you typically have >3 drinks per day or >7 drinks per week? { :199994}    Do you usually eat at least 4 servings of fruit and vegetables a day, include whole grains & fiber and avoid regularly eating high fat or \"junk\" foods? { :548503::\"Yes\"}    Do you have any problems taking medications regularly?  { :958243::\"No\"}    Do you have any side effects from medications? { :987084}    Needs assistance for the following daily activities: { :321825}    Which of the following safety concerns are present in your home?  { :253268::\"none identified\"}     Hearing impairment: { :020160}    In the past 6 months, have you been bothered by leaking of urine? { :520811}  HEARING FREQUENCY:   Right Ear:     500 Hz :  ***   1000 Hz:  ***   2000 Hz:  ***   4000 Hz:  ***  Left Ear:     500 Hz :  ***   1000 Hz:  ***   2000 Hz:  ***   4000 Hz:  ***  Pauline Peralta, [facility] 7/30/2021      Mental Health:    In general, how would you rate your overall mental or emotional health? { :552792}  PHQ-2 Score:      Do you feel safe in your " "environment? { :786969}    Have you ever done Advance Care Planning? (For example, a Health Directive, POLST, or a discussion with a medical provider or your loved ones about your wishes): { :836324}    Additional concerns to address?  {If YES, notify patient they may be responsible for a copay, coinsurance or deductible if the visit today includes services such as checking on a sore throat, having an x-ray or lab test, or treating and evaluating a new or existing condition :393974::\"No\"}    Fall risk:  { :459765}  {If any of the above assessments are answered yes, consider ordering appropriate referrals (Optional):925292::\"click delete button to remove this line now\"}  Cognitive Screening: { :629521}    {Do you have sleep apnea, excessive snoring or daytime drowsiness? (Optional):749626}    {Outside tests to abstract? :861234}    {additional problems to add (Optional):276171}    Reviewed and updated as needed this visit by clinical staff                 Reviewed and updated as needed this visit by Provider                Social History     Tobacco Use     Smoking status: Never Smoker     Smokeless tobacco: Never Used   Substance Use Topics     Alcohol use: Yes     Alcohol/week: 10.0 standard drinks     Types: 10 Glasses of wine per week                           Current providers sharing in care for this patient include: {Rooming staff:  Please update Care Team in Rooming Activity, refresh this note and then delete this statement}  Patient Care Team:  David Padron MD as PCP - General (Family Practice)  David Padron MD as Assigned PCP    The following health maintenance items are reviewed in Epic and correct as of today:  Health Maintenance   Topic Date Due     ADVANCE CARE PLANNING  12/03/2018     AORTIC ANEURYSM SCREENING (SYSTEM ASSIGNED)  Never done     FALL RISK ASSESSMENT  03/21/2020     Pneumococcal Vaccine: 65+ Years (2 of 2 - PPSV23) 03/21/2020     PHQ-2  01/01/2021     INFLUENZA VACCINE (1) " "2021     MEDICARE ANNUAL WELLNESS VISIT  2022     LIPID  2024     COLORECTAL CANCER SCREENING  10/19/2025     DTAP/TDAP/TD IMMUNIZATION (3 - Td or Tdap) 10/17/2027     HEPATITIS C SCREENING  Completed     ZOSTER IMMUNIZATION  Completed     COVID-19 Vaccine  Completed     IPV IMMUNIZATION  Aged Out     MENINGITIS IMMUNIZATION  Aged Out     HEPATITIS B IMMUNIZATION  Aged Out     {Chronicprobdata (Optional):597848}  {Decision Support (Optional):718726}    ROS:  {ROS COMP:277246}    OBJECTIVE:   There were no vitals taken for this visit. Estimated body mass index is 31.75 kg/m  as calculated from the following:    Height as of 3/21/19: 1.727 m (5' 8\").    Weight as of 3/21/19: 94.7 kg (208 lb 12.8 oz).  EXAM:   {Exam :325677}    {Diagnostic Test Results (Optional):538269::\"Diagnostic Test Results:\",\"Labs reviewed in Epic\"}    ASSESSMENT / PLAN:   {Diag Picklist:070751}    Patient has been advised of split billing requirements and indicates understanding: {YES / NO:823415::\"Yes\"}    COUNSELING:  {Medicare Counselin}    Estimated body mass index is 31.75 kg/m  as calculated from the following:    Height as of 3/21/19: 1.727 m (5' 8\").    Weight as of 3/21/19: 94.7 kg (208 lb 12.8 oz).    {Weight Management Plan (ACO) Complete if BMI is abnormal-  Ages 18-64  BMI >24.9.  Age 65+ with BMI <23 or >30 (Optional):343354}    He reports that he has never smoked. He has never used smokeless tobacco.    Appropriate preventive services were discussed with this patient, including applicable screening as appropriate for cardiovascular disease, diabetes, osteopenia/osteoporosis, and glaucoma.  As appropriate for age/gender, discussed screening for colorectal cancer, prostate cancer, breast cancer, and cervical cancer. Checklist reviewing preventive services available has been given to the patient.    Reviewed patients plan of care and provided an AVS. The {CarePlan:256457} for Darrel meets the Care Plan " requirement. This Care Plan has been established and reviewed with the {PATIENT, FAMILY MEMBER, CAREGIVER:367305}.    Counseling Resources:  ATP IV Guidelines  Pooled Cohorts Equation Calculator  Breast Cancer Risk Calculator  BRCA-Related Cancer Risk Assessment: FHS-7 Tool  FRAX Risk Assessment  ICSI Preventive Guidelines  Dietary Guidelines for Americans, 2010  USDA's MyPlate  ASA Prophylaxis  Lung CA Screening    David Padron MD  [facility]

## 2021-08-06 ENCOUNTER — OFFICE VISIT (OUTPATIENT)
Dept: FAMILY MEDICINE | Facility: CLINIC | Age: 66
End: 2021-08-06

## 2021-08-06 VITALS
HEART RATE: 69 BPM | HEIGHT: 68 IN | WEIGHT: 209 LBS | BODY MASS INDEX: 31.67 KG/M2 | DIASTOLIC BLOOD PRESSURE: 100 MMHG | SYSTOLIC BLOOD PRESSURE: 140 MMHG | RESPIRATION RATE: 98 BRPM

## 2021-08-06 DIAGNOSIS — Z23 NEED FOR TUBERCULOSIS VACCINATION: ICD-10-CM

## 2021-08-06 DIAGNOSIS — Z23 NEED FOR VACCINATION: ICD-10-CM

## 2021-08-06 DIAGNOSIS — G40.109 PARTIAL EPILEPSY (H): ICD-10-CM

## 2021-08-06 DIAGNOSIS — Z00.00 ROUTINE GENERAL MEDICAL EXAMINATION AT A HEALTH CARE FACILITY: Primary | ICD-10-CM

## 2021-08-06 DIAGNOSIS — Z13.6 SCREENING FOR HEART DISEASE: ICD-10-CM

## 2021-08-06 DIAGNOSIS — L71.9 ROSACEA: ICD-10-CM

## 2021-08-06 DIAGNOSIS — K21.00 GASTROESOPHAGEAL REFLUX DISEASE WITH ESOPHAGITIS WITHOUT HEMORRHAGE: ICD-10-CM

## 2021-08-06 DIAGNOSIS — I10 BENIGN ESSENTIAL HYPERTENSION: ICD-10-CM

## 2021-08-06 DIAGNOSIS — Z12.5 SCREENING FOR PROSTATE CANCER: ICD-10-CM

## 2021-08-06 DIAGNOSIS — H53.40 VISUAL FIELD CUT: ICD-10-CM

## 2021-08-06 PROCEDURE — 36415 COLL VENOUS BLD VENIPUNCTURE: CPT | Performed by: FAMILY MEDICINE

## 2021-08-06 PROCEDURE — 99212 OFFICE O/P EST SF 10 MIN: CPT | Mod: 25 | Performed by: FAMILY MEDICINE

## 2021-08-06 PROCEDURE — G0439 PPPS, SUBSEQ VISIT: HCPCS | Performed by: FAMILY MEDICINE

## 2021-08-06 PROCEDURE — 90732 PPSV23 VACC 2 YRS+ SUBQ/IM: CPT | Performed by: FAMILY MEDICINE

## 2021-08-06 PROCEDURE — 90471 IMMUNIZATION ADMIN: CPT | Performed by: FAMILY MEDICINE

## 2021-08-06 RX ORDER — AMLODIPINE BESYLATE 5 MG/1
5 TABLET ORAL DAILY
Qty: 90 TABLET | Refills: 3 | Status: SHIPPED | OUTPATIENT
Start: 2021-08-06 | End: 2022-08-15

## 2021-08-06 ASSESSMENT — MIFFLIN-ST. JEOR: SCORE: 1697.52

## 2021-08-06 ASSESSMENT — ANXIETY QUESTIONNAIRES
7. FEELING AFRAID AS IF SOMETHING AWFUL MIGHT HAPPEN: NOT AT ALL
IF YOU CHECKED OFF ANY PROBLEMS ON THIS QUESTIONNAIRE, HOW DIFFICULT HAVE THESE PROBLEMS MADE IT FOR YOU TO DO YOUR WORK, TAKE CARE OF THINGS AT HOME, OR GET ALONG WITH OTHER PEOPLE: NOT DIFFICULT AT ALL
6. BECOMING EASILY ANNOYED OR IRRITABLE: NOT AT ALL
5. BEING SO RESTLESS THAT IT IS HARD TO SIT STILL: NOT AT ALL
3. WORRYING TOO MUCH ABOUT DIFFERENT THINGS: SEVERAL DAYS
GAD7 TOTAL SCORE: 1
2. NOT BEING ABLE TO STOP OR CONTROL WORRYING: NOT AT ALL
1. FEELING NERVOUS, ANXIOUS, OR ON EDGE: NOT AT ALL

## 2021-08-06 ASSESSMENT — PATIENT HEALTH QUESTIONNAIRE - PHQ9
SUM OF ALL RESPONSES TO PHQ QUESTIONS 1-9: 0
5. POOR APPETITE OR OVEREATING: NOT AT ALL

## 2021-08-06 NOTE — LETTER
[facility] [address] [place]  [address]  Phone: [phone]    August 13, 2021      Darrel Bernal  [facility] [id number]              Dear Darrel,     I am writing to report that your included test results are within expected ranges. I do not suggest that we make any changes at this time.     David Padron M.D./gifty      Results for orders placed or performed in visit on 08/06/21   Lipid Panel ([facility])     Status: Abnormal   Result Value Ref Range    Cholesterol 223 (H) 100 - 199 mg/dL    Triglycerides 68 0 - 149 mg/dL    HDL Cholesterol 65 >39 mg/dL    VLDL Cholesterol Andrew 12 5 - 40 mg/dL    LDL Cholesterol Calculated 146 (H) 0 - 99 mg/dL    LDL/HDL Ratio 2.2 0.0 - 3.6 ratio    Narrative    Performed at:  01 - [facility] [address], [place], [place]  [id number]  : Devaughn Rizzo MD, Phone:  [phone]   PSA Serum ([facility])     Status: None   Result Value Ref Range    PSA NG/ML 1.6 0.0 - 4.0 ng/mL    Narrative    Performed at:  01 - [facility] [address], [place], [place]  [id number]  : Devaughn Rizzo MD, Phone:  [phone]   Comp. Metabolic Panel (14) ([facility])     Status: Abnormal   Result Value Ref Range    Glucose 101 (H) 65 - 99 mg/dL    Urea Nitrogen 22 8 - 27 mg/dL    Creatinine 0.92 0.76 - 1.27 mg/dL    eGFR If NonAfricn Am 86 >59 mL/min/1.73    eGFR If Africn Am 99 >59 mL/min/1.73    BUN/Creatinine Ratio 24 10 - 24    Sodium 141 134 - 144 mmol/L    Potassium 4.5 3.5 - 5.2 mmol/L    Chloride 104 96 - 106 mmol/L    Total CO2 22 20 - 29 mmol/L    Calcium 9.6 8.6 - 10.2 mg/dL    Protein Total 7.6 6.0 - 8.5 g/dL    Albumin 4.8 3.8 - 4.8 g/dL    Globulin, Total 2.8 1.5 - 4.5 g/dL    A/G Ratio 1.7 1.2 - 2.2    Bilirubin Total 0.9 0.0 - 1.2 mg/dL    Alkaline Phosphatase 87 48 - 121 IU/L    AST 25 0 - 40 IU/L    ALT 30 0 - 44 IU/L    Narrative    Performed at:  01 - [facility] [address], [place], [place]  [id number]  Lab  Director: Devaughn Rizzo MD, Phone:  [phone]   QuantiFERON TB Gold Plus ([facility])     Status: None   Result Value Ref Range    Quantiferon Incubation Incubation performed.     QuantiFERON Criteria Comment     QuantiFERON TB1 Ag Value 0.04 IU/mL    QuantiFERON TB2 Ag Value 0.05 IU/mL    QuantiFERON Nil Value 0.05 IU/mL    QuantiFERON Mitogen Value >10.00 IU/mL    QuantiFERON TB Gold Plus Negative Negative    Narrative    Performed at:  01 - [facility] [place] [address], [place], [place]  [id number]  : Sylvester Napoles PhD, Phone:  [phone]  Performed at:  02 - [facility] [address], [place], [place]  [id number]  : Devaughn Rizzo MD, Phone:  [phone]   Hematopath Consultation, Smear ([facility])     Status: None   Result Value Ref Range    WBC Appear normal.     RBC Appear normal.     PLTs Appear normal.     Pathologist Comment     WBC 5.3 3.4 - 10.8 x10E3/uL    RBC 5.13 4.14 - 5.80 x10E6/uL    Hemoglobin 16.0 13.0 - 17.7 g/dL    Hematocrit 49.5 37.5 - 51.0 %    MCV 97 79 - 97 fL    MCH 31.2 26.6 - 33.0 pg    MCHC 32.3 31.5 - 35.7 g/dL    RDW 13.0 11.6 - 15.4 %    Platelets 298 150 - 450 x10E3/uL    % Neutrophils 66 Not Estab. %    % Lymphocytes 18 Not Estab. %    % Monocytes 11 Not Estab. %    % Eosinophils 3 Not Estab. %    % Basophils 1 Not Estab. %    Neutrophils (Absolute) 3.6 1.4 - 7.0 x10E3/uL    Lymphs (Absolute) 1.0 0.7 - 3.1 x10E3/uL    Monocytes(Absolute) 0.6 0.1 - 0.9 x10E3/uL    Eos (Absolute) 0.2 0.0 - 0.4 x10E3/uL    Baso (Absolute) 0.1 0.0 - 0.2 x10E3/uL    Immature Granulocytes 1 Not Estab. %    Immature Grans (Abs) 0.0 0.0 - 0.1 x10E3/uL    Narrative    Performed at:  01 - [facility]  [id number]  : Juan Jose Kwan MD, Phone:  [phone]  Performed at:  02 - [facility] [address], [place], [place]  [id number]  : Devaughn Rizzo MD, Phone:  [phone]

## 2021-08-06 NOTE — NURSING NOTE
Due to abnormal WBC diff shift- reordered CBc as sent out to  with hepatopath consultation  Review  Ok'd per BETITO James MA August 6, 2021 12:24 PM  Message sent to Vito Hernandez MD

## 2021-08-06 NOTE — PROGRESS NOTES
3  SUBJECTIVE:   CC: Darrel Bernal is an 66 year old male who presents for preventive health visit.   Essential Hypertension   Remains well controlled when checked out of clinic.   he has not experienced any significant side effects from medications for hypertension.    NO active cardiac complaints or symptoms with exercise.  Current medications for treatment:  ACE inhibitors (Lisinopril, Ramipril,Captopril,Benazepril)    Reviewed last 6 BP readings in chart:  BP Readings from Last 6 Encounters:   08/06/21 (!) 140/100   03/21/19 138/88   01/31/19 138/86   12/04/17 110/80   11/02/17 124/88   10/17/17 (!) 144/108           Patient has been advised of split billing requirements and indicates understanding: Yes  Healthy Habits:    Do you get at least three servings of calcium containing foods daily (dairy, green leafy vegetables, etc.)? yes    Amount of exercise or daily activities, outside of work: 7 day(s) per week    Problems taking medications regularly No    Medication side effects: No    Have you had an eye exam in the past two years? yes    Do you see a dentist twice per year? yes    Do you have sleep apnea, excessive snoring or daytime drowsiness?no    HEARING FREQUENCY:   Right Ear:     500 Hz :  pass   1000 Hz:  fail   2000 Hz:  pass   4000 Hz:  pass  Left Ear:     500 Hz :  pass   1000 Hz:  pass   2000 Hz:  pass   4000 Hz:  pass  Pauline Peralta CMA 8/6/2021      Today's PHQ-2 Score:   PHQ-2 ( 1999 Pfizer) 8/6/2021 3/21/2019   Q1: Little interest or pleasure in doing things 0 0   Q2: Feeling down, depressed or hopeless 0 1   PHQ-2 Score 0 1       Abuse: Current or Past(Physical, Sexual or Emotional)- No  Do you feel safe in your environment? Yes    Have you ever done Advance Care Planning? (For example, a Health Directive, POLST, or a discussion with a medical provider or your loved ones about your wishes): No, advance care planning information given to patient to review.  Advanced care planning was  "discussed at today's visit.    Social History     Tobacco Use     Smoking status: Never Smoker     Smokeless tobacco: Never Used   Substance Use Topics     Alcohol use: Yes     Alcohol/week: 10.0 standard drinks     Types: 10 Glasses of wine per week     If you drink alcohol do you typically have >3 drinks per day or >7 drinks per week? No                      Last PSA: No results found for: PSA    Reviewed orders with patient. Reviewed health maintenance and updated orders accordingly - Yes  Lab work is in process  Labs reviewed in EPIC    Reviewed and updated as needed this visit by clinical staff   Allergies  Meds              Reviewed and updated as needed this visit by Provider                    ROS:  CONSTITUTIONAL: NEGATIVE for fever, chills, change in weight  INTEGUMENTARY/SKIN: NEGATIVE for worrisome rashes, moles or lesions  EYES: NEGATIVE for vision changes or irritation  ENT: NEGATIVE for ear, mouth and throat problems  RESP: NEGATIVE for significant cough or SOB  CV: NEGATIVE for chest pain, palpitations or peripheral edema  GI: NEGATIVE for nausea, abdominal pain, heartburn, or change in bowel habits   male: negative for dysuria, hematuria, decreased urinary stream, erectile dysfunction, urethral discharge  MUSCULOSKELETAL: NEGATIVE for significant arthralgias or myalgia  NEURO: NEGATIVE for weakness, dizziness or paresthesias  PSYCHIATRIC: NEGATIVE for changes in mood or affect    OBJECTIVE:   BP (!) 140/100   Pulse 69   Resp (!) 98   Ht 1.727 m (5' 8\")   Wt 94.8 kg (209 lb)   BMI 31.78 kg/m    EXAM:  GENERAL: healthy, alert and no distress  EYES: Eyes grossly normal to inspection, PERRL and conjunctivae and sclerae normal  HENT: ear canals and TM's normal, nose and mouth without ulcers or lesions  NECK: no adenopathy, no asymmetry, masses, or scars and thyroid normal to palpation  RESP: lungs clear to auscultation - no rales, rhonchi or wheezes  CV: regular rate and rhythm, normal S1 S2, no " S3 or S4, no murmur, click or rub, no peripheral edema and peripheral pulses strong  ABDOMEN: soft, nontender, no hepatosplenomegaly, no masses and bowel sounds normal   (male): normal male genitalia without lesions or urethral discharge, no hernia  RECTAL: normal sphincter tone, no rectal masses, prostate normal size, smooth, nontender without nodules or masses  MS: no gross musculoskeletal defects noted, no edema  SKIN: no suspicious lesions or rashes  NEURO: Normal strength and tone, mentation intact and speech normal  PSYCH: mentation appears normal, affect normal/bright    Diagnostic Test Results:  Labs reviewed in Epic    ASSESSMENT/PLAN:   Darrel was seen today for physical.    Diagnoses and all orders for this visit:    Routine general medical examination at a health care facility    Partial epilepsy (H)  No recurrenc and on no meds.  Visual field cut    Rosacea  No treatments  Gastroesophageal reflux disease with esophagitis without hemorrhage  Discussed the functional nature of this condition regarding what they eat, how they eat, when they eat, and how much they eat as all contributing to gastroesophageal symptoms.    Recommend anti-reflux diet and eating patterns emphasizing smaller more frequent meals and timing relative to bedtime.  Also recommend avoiding tomatoes, onions, spicy foods, caffeine, or any other food/beverage that cause increased reflux.    If symptoms not controlled on current therapies, then need to return for further evaluation and consideration of further studies.    -     CBC with Diff/Plt ([facility])    Need for vaccination  -     PNEUMOCOCCAL VACCINE,ADULT,SQ OR IM    Screening for heart disease  -     Lipid Panel ([facility])  -     Comp. Metabolic Panel (14) ([facility])    Screening for prostate cancer  -     PSA Serum ([facility])    Need for tuberculosis vaccination  -     QuantiFERON TB Gold Plus ([facility])    Benign essential hypertension  Reviewed his current HTN management. Discussed our  "goal for him is a systolic pressure at or below 128 and diastolic pressure at or below 83.  We today managed his prescriptions with refills ensured to ensure availabilty of current medications.  Discussed the importance for aggressive management of HTN to prevent vascular complications later.  Recommended lower fat, lower carbohydrate, and lower sodium (<2000 mg)diet. Required intervals for follow up on HTN, lab studies reviewed.    Strongly recommened he follow his blood pressures outside the clinic to ensure that BPs are remaining within guidelines,.  Instructed to contact me if the readings are not within guidelines on a regular basis so we can adjust treatment as needed.    -     amLODIPine (NORVASC) 5 MG tablet; Take 1 tablet (5 mg) by mouth daily        Patient has been advised of split billing requirements and indicates understanding: Yes  COUNSELING:  Reviewed preventive health counseling, as reflected in patient instructions       Regular exercise       Healthy diet/nutrition    Estimated body mass index is 31.78 kg/m  as calculated from the following:    Height as of this encounter: 1.727 m (5' 8\").    Weight as of this encounter: 94.8 kg (209 lb).        He reports that he has never smoked. He has never used smokeless tobacco.      Counseling Resources:  ATP IV Guidelines  Pooled Cohorts Equation Calculator  FRAX Risk Assessment  ICSI Preventive Guidelines  Dietary Guidelines for Americans, 2010  USDA's MyPlate  ASA Prophylaxis  Lung CA Screening    David Padron MD  [facility]  "

## 2021-08-07 LAB
ALBUMIN SERPL-MCNC: 4.8 G/DL (ref 3.8–4.8)
ALBUMIN/GLOB SERPL: 1.7 {RATIO} (ref 1.2–2.2)
ALP SERPL-CCNC: 87 IU/L (ref 48–121)
ALT SERPL-CCNC: 30 IU/L (ref 0–44)
AST SERPL-CCNC: 25 IU/L (ref 0–40)
BILIRUB SERPL-MCNC: 0.9 MG/DL (ref 0–1.2)
BUN SERPL-MCNC: 22 MG/DL (ref 8–27)
BUN/CREATININE RATIO: 24 (ref 10–24)
CALCIUM SERPL-MCNC: 9.6 MG/DL (ref 8.6–10.2)
CHLORIDE SERPLBLD-SCNC: 104 MMOL/L (ref 96–106)
CHOLEST SERPL-MCNC: 223 MG/DL (ref 100–199)
CREAT SERPL-MCNC: 0.92 MG/DL (ref 0.76–1.27)
EGFR IF AFRICN AM: 99 ML/MIN/1.73
EGFR IF NONAFRICN AM: 86 ML/MIN/1.73
GLOBULIN, TOTAL: 2.8 G/DL (ref 1.5–4.5)
GLUCOSE SERPL-MCNC: 101 MG/DL (ref 65–99)
HDLC SERPL-MCNC: 65 MG/DL
LDL/HDL RATIO: 2.2 RATIO (ref 0–3.6)
LDLC SERPL CALC-MCNC: 146 MG/DL (ref 0–99)
POTASSIUM SERPL-SCNC: 4.5 MMOL/L (ref 3.5–5.2)
PROT SERPL-MCNC: 7.6 G/DL (ref 6–8.5)
PSA NG/ML: 1.6 NG/ML (ref 0–4)
SODIUM SERPL-SCNC: 141 MMOL/L (ref 134–144)
TOTAL CO2: 22 MMOL/L (ref 20–29)
TRIGL SERPL-MCNC: 68 MG/DL (ref 0–149)
VLDLC SERPL CALC-MCNC: 12 MG/DL (ref 5–40)

## 2021-08-07 ASSESSMENT — ANXIETY QUESTIONNAIRES: GAD7 TOTAL SCORE: 1

## 2021-08-09 LAB
BASOPHILS # BLD AUTO: 0.1 X10E3/UL (ref 0–0.2)
BASOPHILS NFR BLD AUTO: 1 %
EOSINOPHIL # BLD AUTO: 0.2 X10E3/UL (ref 0–0.4)
EOSINOPHIL NFR BLD AUTO: 3 %
ERYTHROCYTE [DISTWIDTH] IN BLOOD BY AUTOMATED COUNT: 13 % (ref 11.6–15.4)
HCT VFR BLD AUTO: 49.5 % (ref 37.5–51)
HEMOGLOBIN: 16 G/DL (ref 13–17.7)
IMMATURE GRANS (ABS): 0 X10E3/UL (ref 0–0.1)
IMMATURE GRANULOCYTES: 1 %
LYMPHOCYTES # BLD AUTO: 1 X10E3/UL (ref 0.7–3.1)
LYMPHOCYTES NFR BLD AUTO: 18 %
MCH RBC QN AUTO: 31.2 PG (ref 26.6–33)
MCHC RBC AUTO-ENTMCNC: 32.3 G/DL (ref 31.5–35.7)
MCV RBC AUTO: 97 FL (ref 79–97)
MONOCYTES # BLD AUTO: 0.6 X10E3/UL (ref 0.1–0.9)
MONOCYTES NFR BLD AUTO: 11 %
NEUTROPHILS # BLD AUTO: 3.6 X10E3/UL (ref 1.4–7)
NEUTROPHILS NFR BLD AUTO: 66 %
PATHOLOGIST: NORMAL
PLATELETS: 298 X10E3/UL (ref 150–450)
PLTS: NORMAL
RBC # BLD AUTO: 5.13 X10E6/UL (ref 4.14–5.8)
RBC CHRM BLD SMEAR: NORMAL
WBC # BLD AUTO: 5.3 X10E3/UL (ref 3.4–10.8)
WBC # BLD AUTO: NORMAL 10*3/UL

## 2021-08-11 LAB
QUANTIFERON CRITERIA: NORMAL
QUANTIFERON INCUBATION: NORMAL
QUANTIFERON MITOGEN VALUE: >10 IU/ML
QUANTIFERON NIL VALUE: 0.05 IU/ML
QUANTIFERON TB GOLD PLUS: NEGATIVE
QUANTIFERON TB1 AG VALUE: 0.04 IU/ML
QUANTIFERON TB2 AG VALUE: 0.05 IU/ML

## 2021-10-24 ENCOUNTER — HEALTH MAINTENANCE LETTER (OUTPATIENT)
Age: 66
End: 2021-10-24

## 2021-10-24 DIAGNOSIS — F51.02 ADJUSTMENT INSOMNIA: Primary | ICD-10-CM

## 2021-10-24 RX ORDER — ZOLPIDEM TARTRATE 10 MG/1
10 TABLET ORAL
Qty: 30 TABLET | Refills: 0 | Status: SHIPPED | OUTPATIENT
Start: 2021-10-24

## 2022-04-14 DIAGNOSIS — I10 BENIGN ESSENTIAL HYPERTENSION: ICD-10-CM

## 2022-04-14 RX ORDER — LISINOPRIL 10 MG/1
TABLET ORAL
Qty: 90 TABLET | Refills: 3 | Status: SHIPPED | OUTPATIENT
Start: 2022-04-14 | End: 2022-08-26

## 2022-08-14 DIAGNOSIS — I10 BENIGN ESSENTIAL HYPERTENSION: ICD-10-CM

## 2022-08-15 RX ORDER — AMLODIPINE BESYLATE 5 MG/1
TABLET ORAL
Qty: 90 TABLET | Refills: 3 | Status: SHIPPED | OUTPATIENT
Start: 2022-08-15

## 2022-08-23 ASSESSMENT — ENCOUNTER SYMPTOMS
MYALGIAS: 0
EYE PAIN: 0
SORE THROAT: 0
FREQUENCY: 1
JOINT SWELLING: 0
CHILLS: 0
FEVER: 0
SHORTNESS OF BREATH: 0
DIARRHEA: 0
HEARTBURN: 0
NERVOUS/ANXIOUS: 0
DYSURIA: 0
ARTHRALGIAS: 1
HEADACHES: 0
ABDOMINAL PAIN: 0
HEMATOCHEZIA: 0
CONSTIPATION: 0
COUGH: 0
NAUSEA: 0
HEMATURIA: 0
WEAKNESS: 0
PALPITATIONS: 0
DIZZINESS: 0
PARESTHESIAS: 0

## 2022-08-23 ASSESSMENT — ACTIVITIES OF DAILY LIVING (ADL): CURRENT_FUNCTION: NO ASSISTANCE NEEDED

## 2022-08-25 NOTE — PROGRESS NOTES
"  SUBJECTIVE:   Darrel Bernal is a 67 year old male who presents for Preventive Visit.  At home bp is 130/85 lyn    No longer on any seizure meds for the minor abscence    Patient has been advised of split billing requirements and indicates understanding: Yes  Are you in the first 12 months of your Medicare Part B coverage?  No    Physical Health:  Answers for HPI/ROS submitted by the patient on 8/23/2022  In general, how would you rate your overall physical health?: good  Frequency of exercise:: 4-5 days/week  Do you usually eat at least 4 servings of fruit and vegetables a day, include whole grains & fiber, and avoid regularly eating high fat or \"junk\" foods? : No  Taking medications regularly:: Yes  Medication side effects:: None  Activities of Daily Living: no assistance needed  Home safety: no safety concerns identified  Hearing Impairment:: no hearing concerns  In the past 6 months, have you been bothered by leaking of urine?: Yes  abdominal pain: No  Blood in stool: No  Blood in urine: No  chest pain: No  chills: No  congestion: No  constipation: No  cough: No  diarrhea: No  dizziness: No  ear pain: No  eye pain: No  nervous/anxious: No  fever: No  frequency: Yes  genital sores: No  headaches: No  hearing loss: No  heartburn: No  arthralgias: Yes  joint swelling: No  peripheral edema: No  mood changes: No  myalgias: No  nausea: No  dysuria: No  palpitations: No  Skin sensation changes: No  sore throat: No  urgency: No  rash: No  shortness of breath: No  visual disturbance: No  weakness: No  impotence: Yes  penile discharge: No  In general, how would you rate your overall mental or emotional health?: excellent  Duration of exercise:: Greater than 60 minutes  HEARING FREQUENCY:   Right Ear:     500 Hz :  pass   1000 Hz:  pass   2000 Hz:  pass   4000 Hz:  pass  Left Ear:     500 Hz :  pass   1000 Hz:  pass   2000 Hz:  pass   4000 Hz:  pass  Pauline Peralta CMA 8/26/2022        Mental Health:    In general, " how would you rate your overall mental or emotional health? excellent  PHQ-2 Score: 0    Do you feel safe in your environment? Yes    Have you ever done Advance Care Planning? (For example, a Health Directive, POLST, or a discussion with a medical provider or your loved ones about your wishes): Yes, patient states has an Advance Care Planning document and will bring a copy to the clinic.    Additional concerns to address?      Fall risk:  Fallen 2 or more times in the past year?: No  Any fall with injury in the past year?: No    Cognitive Screenin) Repeat 3 items (Leader, Season, Table)    2) Clock draw: NORMAL  3) 3 item recall: Recalls 3 objects  Results: 3 items recalled: COGNITIVE IMPAIRMENT LESS LIKELY    Mini-CogTM Copyright JEAN De. Licensed by the author for use in [facility] [employer]; reprinted with permission (soniya@[facility]). All rights reserved.      Do you have sleep apnea, excessive snoring or daytime drowsiness?: no        PROBLEMS TO ADD ON...  -------------------------------------    Reviewed and updated as needed this visit by clinical staff    Allergies  Meds                Reviewed and updated as needed this visit by Provider                   Social History     Tobacco Use     Smoking status: Never Smoker     Smokeless tobacco: Never Used   Substance Use Topics     Alcohol use: Yes     Alcohol/week: 10.0 standard drinks     Types: 10 Glasses of wine per week                           Current providers sharing in care for this patient include:   Patient Care Team:  David Padron MD as PCP - General (Family Practice)  David Padron MD as Assigned PCP    The following health maintenance items are reviewed in Epic and correct as of today:  Health Maintenance   Topic Date Due     AORTIC ANEURYSM SCREENING (SYSTEM ASSIGNED)  Never done     INFLUENZA VACCINE (1) 2022     MEDICARE ANNUAL WELLNESS VISIT  2023     FALL RISK ASSESSMENT  2023     COLORECTAL CANCER  "SCREENING  10/19/2025     LIPID  08/06/2026     ADVANCE CARE PLANNING  08/26/2027     DTAP/TDAP/TD IMMUNIZATION (3 - Td or Tdap) 10/17/2027     HEPATITIS C SCREENING  Completed     PHQ-2 (once per calendar year)  Completed     Pneumococcal Vaccine: 65+ Years  Completed     ZOSTER IMMUNIZATION  Completed     COVID-19 Vaccine  Completed     IPV IMMUNIZATION  Aged Out     MENINGITIS IMMUNIZATION  Aged Out     HEPATITIS B IMMUNIZATION  Aged Out     Lab work is in process      ROS:  Constitutional, HEENT, cardiovascular, pulmonary, GI, , musculoskeletal, neuro, skin, endocrine and psych systems are negative, except as otherwise noted.    OBJECTIVE:   BP (!) 156/104   Pulse 65   Resp 16   Ht 1.702 m (5' 7\")   Wt 92.9 kg (204 lb 12.8 oz)   SpO2 98%   BMI 32.08 kg/m   Estimated body mass index is 32.08 kg/m  as calculated from the following:    Height as of this encounter: 1.702 m (5' 7\").    Weight as of this encounter: 92.9 kg (204 lb 12.8 oz).  EXAM:   GENERAL: healthy, alert and no distress  EYES: Eyes grossly normal to inspection, PERRL and conjunctivae and sclerae normal  HENT: ear canals and TM's normal, nose and mouth without ulcers or lesions  NECK: no adenopathy, no asymmetry, masses, or scars and thyroid normal to palpation  RESP: lungs clear to auscultation - no rales, rhonchi or wheezes  CV: regular rate and rhythm, normal S1 S2, no S3 or S4, no murmur, click or rub, no peripheral edema and peripheral pulses strong  ABDOMEN: soft, nontender, no hepatosplenomegaly, no masses and bowel sounds normal  RECTAL: normal sphincter tone, no rectal masses, prostate normal size, smooth, nontender without nodules or masses  MS: no gross musculoskeletal defects noted, no edema  SKIN: no suspicious lesions or rashes  NEURO: Normal strength and tone, mentation intact and speech normal  PSYCH: mentation appears normal, affect normal/bright    Diagnostic Test Results:  Labs reviewed in Epic    ASSESSMENT / PLAN:   Darrel" "was seen today for physical and hearing screening.    Diagnoses and all orders for this visit:    Encounter for Medicare annual wellness exam    Partial epilepsy (H)  No longer   Congenital anomaly of cerebrovascular system    Rosacea    Benign essential hypertension  Reviewed his current HTN management. Discussed our goal for him is a systolic pressure at or below 128 and diastolic pressure at or below 83.  We today managed his prescriptions with refills ensured to ensure availabilty of current medications.  Discussed the importance for aggressive management of HTN to prevent vascular complications later.  Recommended lower fat, lower carbohydrate, and lower sodium (<2000 mg)diet. Required intervals for follow up on HTN, lab studies reviewed.    Strongly recommened he follow his blood pressures outside the clinic to ensure that BPs are remaining within guidelines,.  Instructed to contact me if the readings are not within guidelines on a regular basis so we can adjust treatment as needed.    -     Comp. Metabolic Panel (14) ([facility])  -     CBC with Diff/Plt ([facility])  -     lisinopril (ZESTRIL) 20 MG tablet; Take 1 tablet (20 mg) by mouth daily    Hypercholesteremia  -     Lipid Profile ([facility])    Screening for prostate cancer  -     PSA Serum ([facility])        Patient has been advised of split billing requirements and indicates understanding: Yes    COUNSELING:  Reviewed preventive health counseling, as reflected in patient instructions       Regular exercise       Healthy diet/nutrition    Estimated body mass index is 32.08 kg/m  as calculated from the following:    Height as of this encounter: 1.702 m (5' 7\").    Weight as of this encounter: 92.9 kg (204 lb 12.8 oz).    Weight management plan: Discussed healthy diet and exercise guidelines    He reports that he has never smoked. He has never used smokeless tobacco.    Appropriate preventive services were discussed with this patient, including applicable screening as " appropriate for cardiovascular disease, diabetes, osteopenia/osteoporosis, and glaucoma.  As appropriate for age/gender, discussed screening for colorectal cancer, prostate cancer, breast cancer, and cervical cancer. Checklist reviewing preventive services available has been given to the patient.    Reviewed patients plan of care and provided an AVS. The Basic Care Plan (routine screening as documented in Health Maintenance) for Darrel meets the Care Plan requirement. This Care Plan has been established and reviewed with the Patient.    Counseling Resources:  ATP IV Guidelines  Pooled Cohorts Equation Calculator  Breast Cancer Risk Calculator  BRCA-Related Cancer Risk Assessment: FHS-7 Tool  FRAX Risk Assessment  ICSI Preventive Guidelines  Dietary Guidelines for Americans, 2010  USDA's MyPlate  ASA Prophylaxis  Lung CA Screening    David Padron MD  [facility]

## 2022-08-25 NOTE — PATIENT INSTRUCTIONS
Patient Education   Personalized Prevention Plan  You are due for the preventive services outlined below.  Your care team is available to assist you in scheduling these services.  If you have already completed any of these items, please share that information with your care team to update in your medical record.  Health Maintenance Due   Topic Date Due    AORTIC ANEURYSM SCREENING (SYSTEM ASSIGNED)  Never done    Flu Vaccine (1) 09/01/2022

## 2022-08-26 ENCOUNTER — OFFICE VISIT (OUTPATIENT)
Dept: FAMILY MEDICINE | Facility: CLINIC | Age: 67
End: 2022-08-26

## 2022-08-26 VITALS
SYSTOLIC BLOOD PRESSURE: 156 MMHG | WEIGHT: 204.8 LBS | DIASTOLIC BLOOD PRESSURE: 104 MMHG | HEIGHT: 67 IN | HEART RATE: 65 BPM | RESPIRATION RATE: 16 BRPM | OXYGEN SATURATION: 98 % | BODY MASS INDEX: 32.15 KG/M2

## 2022-08-26 DIAGNOSIS — G40.109 PARTIAL EPILEPSY (H): ICD-10-CM

## 2022-08-26 DIAGNOSIS — I10 BENIGN ESSENTIAL HYPERTENSION: ICD-10-CM

## 2022-08-26 DIAGNOSIS — N52.9 VASCULOGENIC ERECTILE DYSFUNCTION, UNSPECIFIED VASCULOGENIC ERECTILE DYSFUNCTION TYPE: ICD-10-CM

## 2022-08-26 DIAGNOSIS — E78.00 HYPERCHOLESTEREMIA: ICD-10-CM

## 2022-08-26 DIAGNOSIS — Z00.00 ENCOUNTER FOR MEDICARE ANNUAL WELLNESS EXAM: Primary | ICD-10-CM

## 2022-08-26 DIAGNOSIS — Q28.3 CONGENITAL ANOMALY OF CEREBROVASCULAR SYSTEM: ICD-10-CM

## 2022-08-26 DIAGNOSIS — L71.9 ROSACEA: ICD-10-CM

## 2022-08-26 DIAGNOSIS — Z12.5 SCREENING FOR PROSTATE CANCER: ICD-10-CM

## 2022-08-26 LAB
% GRANULOCYTES: 71.8 % (ref 42.2–75.2)
HCT VFR BLD AUTO: 42.6 % (ref 39–51)
HEMOGLOBIN: 14.8 G/DL (ref 13.4–17.5)
LYMPHOCYTES NFR BLD AUTO: 21.8 % (ref 20.5–51.1)
MCH RBC QN AUTO: 31.7 PG (ref 27–31)
MCHC RBC AUTO-ENTMCNC: 34.8 G/DL (ref 33–37)
MCV RBC AUTO: 91 FL (ref 80–100)
MONOCYTES NFR BLD AUTO: 6.4 % (ref 1.7–9.3)
PLATELET # BLD AUTO: 295 K/UL (ref 140–450)
RBC # BLD AUTO: 4.67 X10/CMM (ref 4.2–5.9)
WBC # BLD AUTO: 5.4 X10/CMM (ref 3.8–11)

## 2022-08-26 PROCEDURE — 36415 COLL VENOUS BLD VENIPUNCTURE: CPT | Performed by: FAMILY MEDICINE

## 2022-08-26 PROCEDURE — G0439 PPPS, SUBSEQ VISIT: HCPCS | Performed by: FAMILY MEDICINE

## 2022-08-26 PROCEDURE — 85025 COMPLETE CBC W/AUTO DIFF WBC: CPT | Performed by: FAMILY MEDICINE

## 2022-08-26 PROCEDURE — 99214 OFFICE O/P EST MOD 30 MIN: CPT | Mod: 25 | Performed by: FAMILY MEDICINE

## 2022-08-26 RX ORDER — LISINOPRIL 20 MG/1
20 TABLET ORAL DAILY
Qty: 90 TABLET | Refills: 3 | Status: SHIPPED | OUTPATIENT
Start: 2022-08-26

## 2022-08-26 RX ORDER — SILDENAFIL 50 MG/1
50 TABLET, FILM COATED ORAL DAILY PRN
Qty: 30 TABLET | Refills: 3 | Status: SHIPPED | OUTPATIENT
Start: 2022-08-26 | End: 2022-08-26

## 2022-08-26 RX ORDER — SILDENAFIL 50 MG/1
50 TABLET, FILM COATED ORAL DAILY PRN
Qty: 30 TABLET | Refills: 3 | Status: SHIPPED | OUTPATIENT
Start: 2022-08-26

## 2022-08-27 LAB
ALBUMIN SERPL-MCNC: 4.4 G/DL (ref 3.8–4.8)
ALBUMIN/GLOB SERPL: 1.8 {RATIO} (ref 1.2–2.2)
ALP SERPL-CCNC: 78 IU/L (ref 44–121)
ALT SERPL-CCNC: 36 IU/L (ref 0–44)
AST SERPL-CCNC: 24 IU/L (ref 0–40)
BILIRUB SERPL-MCNC: 0.4 MG/DL (ref 0–1.2)
BUN SERPL-MCNC: 19 MG/DL (ref 8–27)
BUN/CREATININE RATIO: 20 (ref 10–24)
CALCIUM SERPL-MCNC: 8.9 MG/DL (ref 8.6–10.2)
CHLORIDE SERPLBLD-SCNC: 101 MMOL/L (ref 96–106)
CHOLEST SERPL-MCNC: 192 MG/DL (ref 100–199)
CREAT SERPL-MCNC: 0.93 MG/DL (ref 0.76–1.27)
EGFR: 89 ML/MIN/1.73
GLOBULIN, TOTAL: 2.5 G/DL (ref 1.5–4.5)
GLUCOSE SERPL-MCNC: 102 MG/DL (ref 65–99)
HDLC SERPL-MCNC: 66 MG/DL
LDL/HDL RATIO: 1.8 RATIO (ref 0–3.6)
LDLC SERPL CALC-MCNC: 116 MG/DL (ref 0–99)
POTASSIUM SERPL-SCNC: 5.1 MMOL/L (ref 3.5–5.2)
PROT SERPL-MCNC: 6.9 G/DL (ref 6–8.5)
PSA NG/ML: 2.2 NG/ML (ref 0–4)
SODIUM SERPL-SCNC: 139 MMOL/L (ref 134–144)
TOTAL CO2: 23 MMOL/L (ref 20–29)
TRIGL SERPL-MCNC: 54 MG/DL (ref 0–149)
VLDLC SERPL CALC-MCNC: 10 MG/DL (ref 5–40)

## 2022-08-29 NOTE — RESULT ENCOUNTER NOTE
Dear Darrel,     I am writing to report that your included test results are as expected.    Many labs contain some results that are slightly outside of the normal range, I have reviewed any of these results and they require no changes at this time.    David Padron MD

## 2022-10-15 ENCOUNTER — HEALTH MAINTENANCE LETTER (OUTPATIENT)
Age: 67
End: 2022-10-15

## 2023-10-29 ENCOUNTER — HEALTH MAINTENANCE LETTER (OUTPATIENT)
Age: 68
End: 2023-10-29

## 2024-12-21 ENCOUNTER — HEALTH MAINTENANCE LETTER (OUTPATIENT)
Age: 69
End: 2024-12-21

## 2025-03-12 DIAGNOSIS — N52.9 VASCULOGENIC ERECTILE DYSFUNCTION, UNSPECIFIED VASCULOGENIC ERECTILE DYSFUNCTION TYPE: ICD-10-CM

## 2025-03-12 RX ORDER — SILDENAFIL 50 MG/1
50 TABLET, FILM COATED ORAL DAILY PRN
Qty: 30 TABLET | Refills: 0 | OUTPATIENT
Start: 2025-03-12